# Patient Record
Sex: FEMALE | Race: WHITE | NOT HISPANIC OR LATINO | URBAN - METROPOLITAN AREA
[De-identification: names, ages, dates, MRNs, and addresses within clinical notes are randomized per-mention and may not be internally consistent; named-entity substitution may affect disease eponyms.]

---

## 2020-03-04 ENCOUNTER — TRANSFERRED RECORDS (OUTPATIENT)
Dept: HEALTH INFORMATION MANAGEMENT | Facility: CLINIC | Age: 60
End: 2020-03-04
Payer: COMMERCIAL

## 2021-12-23 ENCOUNTER — TRANSFERRED RECORDS (OUTPATIENT)
Dept: HEALTH INFORMATION MANAGEMENT | Facility: CLINIC | Age: 61
End: 2021-12-23
Payer: COMMERCIAL

## 2022-02-04 ENCOUNTER — MEDICAL CORRESPONDENCE (OUTPATIENT)
Dept: HEALTH INFORMATION MANAGEMENT | Facility: CLINIC | Age: 62
End: 2022-02-04
Payer: COMMERCIAL

## 2022-02-07 ENCOUNTER — TRANSCRIBE ORDERS (OUTPATIENT)
Dept: OTHER | Age: 62
End: 2022-02-07
Payer: COMMERCIAL

## 2022-02-07 DIAGNOSIS — C49.9 SOFT TISSUE SARCOMA (H): Primary | ICD-10-CM

## 2022-02-14 ENCOUNTER — TELEPHONE (OUTPATIENT)
Dept: ORTHOPEDICS | Facility: CLINIC | Age: 62
End: 2022-02-14
Payer: COMMERCIAL

## 2022-02-14 NOTE — TELEPHONE ENCOUNTER
I spoke with Kenyatta and verified that her previous work ups for the pelvis area have been at [facility] and [facility], she confirmed. I told her that I see her records in Care Everywhere and we will make sure we get the imaging pulled over as well for her appointment this Thursday with Dr. Moreno. She thanked me and had no other questions.  Anisha Davis ATC

## 2022-02-14 NOTE — TELEPHONE ENCOUNTER
[facility] Call Center    Phone Message    May a detailed message be left on voicemail: yes     Reason for Call: Other: Patient would like to know if medical records and imaging was received. Please contact patient. She has an appt this Thurs     Action Taken: Message routed to:  [facility] ([facility] ortho    Travel Screening: Not Applicable

## 2022-02-17 ENCOUNTER — OFFICE VISIT (OUTPATIENT)
Dept: ORTHOPEDICS | Facility: CLINIC | Age: 62
End: 2022-02-17
Payer: COMMERCIAL

## 2022-02-17 ENCOUNTER — DOCUMENTATION ONLY (OUTPATIENT)
Dept: ORTHOPEDICS | Facility: CLINIC | Age: 62
End: 2022-02-17

## 2022-02-17 VITALS — BODY MASS INDEX: 30.31 KG/M2 | HEIGHT: 68 IN | WEIGHT: 200 LBS

## 2022-02-17 DIAGNOSIS — C49.9 SOFT TISSUE SARCOMA (H): ICD-10-CM

## 2022-02-17 PROBLEM — Z90.5 HISTORY OF NEPHRECTOMY: Status: ACTIVE | Noted: 2019-11-16

## 2022-02-17 PROBLEM — Z15.89 MUTATION IN TP53 GENE: Status: ACTIVE | Noted: 2018-09-27

## 2022-02-17 PROBLEM — Z15.01 LI-FRAUMENI SYNDROME: Status: ACTIVE | Noted: 2018-09-27

## 2022-02-17 PROBLEM — Z15.01 MUTATION IN TP53 GENE: Status: ACTIVE | Noted: 2018-09-27

## 2022-02-17 PROBLEM — C48.0: Status: ACTIVE | Noted: 2022-02-17

## 2022-02-17 PROBLEM — N18.30 STAGE 3 CHRONIC KIDNEY DISEASE (H): Status: ACTIVE | Noted: 2019-11-16

## 2022-02-17 PROBLEM — Z15.09 MUTATION IN TP53 GENE: Status: ACTIVE | Noted: 2018-09-27

## 2022-02-17 PROBLEM — E66.9 OBESITY (BMI 30-39.9): Status: ACTIVE | Noted: 2021-10-16

## 2022-02-17 PROCEDURE — 99204 OFFICE O/P NEW MOD 45 MIN: CPT | Performed by: ORTHOPAEDIC SURGERY

## 2022-02-17 RX ORDER — SENNOSIDES A AND B 8.6 MG/1
8.6 TABLET, FILM COATED ORAL
COMMUNITY
End: 2022-03-08

## 2022-02-17 RX ORDER — BUPROPION HYDROCHLORIDE 150 MG/1
TABLET ORAL
COMMUNITY
Start: 2021-09-08 | End: 2022-03-08

## 2022-02-17 RX ORDER — FUROSEMIDE 20 MG
TABLET ORAL
COMMUNITY
Start: 2022-01-03

## 2022-02-17 RX ORDER — BUPROPION HYDROCHLORIDE 150 MG/1
150 TABLET ORAL DAILY
COMMUNITY
Start: 2021-12-27

## 2022-02-17 RX ORDER — CEFADROXIL 500 MG/1
CAPSULE ORAL
COMMUNITY
Start: 2022-01-05 | End: 2022-03-08

## 2022-02-17 RX ORDER — BUPROPION HYDROCHLORIDE 150 MG/1
150 TABLET, EXTENDED RELEASE ORAL
COMMUNITY
End: 2022-03-08

## 2022-02-17 NOTE — PROGRESS NOTES
[facility] Physicians, Orthopaedic Oncology Surgery Consultation  by Miguel Moreno M.D.    Kenyatta Dhillon MRN# [id number]    YOB: 1960     Requesting physician: No ref. provider found  Robbie Garcia MD [facility]            Assessment and Plan:   Assessment:  1. 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  Tissue confirmation with biopsy will be necessary however as other soft tissue neoplasms are possible such as myxoma is for schwannomas are possible.  2. Lesion of right superior pubic ramus adjacent to acetabulum was also noted with marrow signal abnormality.  Likely is related to the soft tissue mass in the buttock region.  PET CT imaging assessment to determine if the pubic ramus lesion is hypermetabolic will be useful.  3. History of leiomyosarcoma x 3 .    4. History of melanoma x2    Plan:  1. We will arrange for return on Monday next week for core needle biopsy with ultrasound guidance.  If visualization is not possible using our clinic ultrasound machine, then we will send her to interventional radiology for CT guidance or deeper radiology probes as needed.  2. Should a malignancy be confirmed, then PET/CT imaging for staging would be recommended.  3.         History of Present Illness:   61 year old female  chief complaint    In Dec 2021, fell down, had pain in many areas.  As part of routine annual full body MRI scanning, Brain MRI, Breast MRI, done in 2/2022, mass found in R buttock.  Asymptomatic at present.      Also has history of prior melanoma x2 and 3 prior surgical excisions of leiomyosarcoma and her groin, retroperitoneum, and uterus.      Current symptoms:  Problem: Possible new tumor in pelvis, right gluteal mass  Onset and duration: Was noticed in December on a full body scan, second full body scan in January to confirm.  Awakens from sleep due to sx's:  No  Precipitating  "Injury:  Yes  Patient fell down a hill in december  Other joints or sites painful:  Yes  Fever: No  Appetite change or weight loss: No  History of prior or existing cancer: Yes    Background history:  DX:  1. 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    TREATMENTS:  1.  / NEPHRECTOMY, History of Donor Nephrectomy  2. HYSTERECTOMY, TOTAL ABDOMINAL /  uterine cancer age 20    3.  / removal of pelvic mass L groin (Dr Isabel Hanson) [facility]  4. 2018 / General surg proc. / Right retroperitoneal tumor removal with Dr. Kelin Hanson; at [facility]; pathology came back consistent as leiomyosarcoma  5. 2021 Esophagogastroduodenoscopy, Surgeon: Hannah Silva MD; Location: [facility] ENDOSCOPY              Physical Exam:     EXAMINATION pertinent findings:   PSYCH: Pleasant, healthy-appearing, alert, oriented x3, cooperative. Normal mood and affect.  VITAL SIGNS: Height 1.727 m (5' 8\"), weight 90.7 kg (200 lb)..  Reviewed nursing intake notes.   Body mass index is 30.41 kg/m .  RESP: non labored breathing   ABD: benign, soft, non-tender, no acute peritoneal findings.  Multiple scars from  and prior retroperitoneal dissection and left lower quadrant.  No organomegaly or masses appreciated.  SKIN: grossly normal .  Multiple sites of prior skin biopsies noted in prior melanoma.  LYMPHATIC: grossly normal, no adenopathy, no extremity edema  NEURO: grossly normal , no motor deficits  VASCULAR: satisfactory perfusion of all extremities   MUSCULOSKELETAL:   Gait is normal.  Full range of motion of right hip joint.  Palpable firmness in the right buttock area, difficult to know if this represents a neoplasm.             Data:   All laboratory data reviewed  All imaging studies reviewed by me      Anatomical Region Laterality Modality   Pelvis -- Magnetic Resonance   Hip -- --     Indication :   Evaluate soft tissue mass.     COMPARISON:   CT scan of the abdomen and " pelvis dated 27 April 2018. Pelvic MRI dated 11 December 2013.     Technique :   Pelvic MRI with T1 in-and out of phase, T2, diffusion weighted, and   progressively delayed post-contrast images. Intravenous gadolinium administered.     Findings :   5.5 x 5.1 cm avidly enhancing well-circumscribed mass in the right gluteus   rafael muscle posterior to the right greater trochanter is new.   2.3 cm peripherally enhancing lesion in the right superior pubic ramus.   No other enhancing masses identified.   No adenopathy.   Edema in the left hamstring tendon. No other bony or soft tissue abnormalities   identified.     Impression :   1. 5.5 cm mass in the right gluteus rafael muscle is suspicious for a sarcoma.   2. Probable metastatic bony lesion in the left superior pubic ramus.   3. Strain versus partial tear of the left hamstring tendon.         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    Also see scanned health assessment forms.       Past Surgical History:   No past surgical history on file.         Social History:     Social History     Socioeconomic History     Marital status:      Spouse name: Not on file     Number of children: Not on file     Years of education: Not on file     Highest education level: Not on file   Occupational History     Not on file   Tobacco Use     Smoking status: Not on file      Smokeless tobacco: Not on file   Substance and Sexual Activity     Alcohol use: Not on file     Drug use: Not on file     Sexual activity: Not on file   Other Topics Concern     Not on file   Social History Narrative     Not on file     Social Determinants of Health     Financial Resource Strain: Not on file   Food Insecurity: Not on file   Transportation Needs: Not on file   Physical Activity: Not on file   Stress: Not on file   Social Connections: Not on file   Intimate Partner Violence: Not on file   Housing Stability: Not on file            Family History:     No family history on file.         Medications:     Current Outpatient Medications   Medication Sig     buPROPion (WELLBUTRIN XL) 150 MG 24 hr tablet TAKE ONE TABLET BY MOUTH ONCE DAILY **NEEDS APPOINTMENT FOR FURTHER REFILLS**     buPROPion (WELLBUTRIN XL) 150 MG 24 hr tablet Take 150 mg by mouth daily     cholecalciferol 50 MCG (2000 UT) CAPS Take 2,000 Units by mouth     furosemide (LASIX) 20 MG tablet TAKE ONE TABLET BY MOUTH ONCE DAILY AS NEEDED     omeprazole (PRILOSEC) 20 MG DR capsule TAKE ONE CAPSULE BY MOUTH TWICE A DAY **NEEDS TO BE SEEN FOR FURTHER REFILLS**     buPROPion (WELLBUTRIN SR) 150 MG 12 hr tablet Take 150 mg by mouth (Patient not taking: Reported on 2/17/2022)     cefadroxil (DURICEF) 500 MG capsule TAKE ONE CAPSULE BY MOUTH TWICE A DAY UNTIL ALL TAKEN (Patient not taking: Reported on 2/17/2022)     melatonin 3 MG CAPS Take 1 capsule by mouth     senna (SENOKOT) 8.6 MG tablet Take 8.6 mg by mouth (Patient not taking: Reported on 2/17/2022)     No current facility-administered medications for this visit.              Review of Systems:   A comprehensive 10 point review of systems (constitutional, ENT, cardiac, peripheral vascular, lymphatic, respiratory, GI, , Musculoskeletal, skin, Neurological) was performed and found to be negative except as described in this note.     See intake form completed by patient

## 2022-02-17 NOTE — LETTER
2/17/2022         RE: Kenyatta Dhillon  [address] [place] [facility] [place] [facility] [place] [address]        Dear Colleague,    Thank you for referring your patient, Kenyatta Dhillon, to the [facility] [place]. Please see a copy of my visit note below.        [facility] Physicians, Orthopaedic Oncology Surgery Consultation  by Miguel Moreno M.D.    Kenyatta Dhillon MRN# [id number]    YOB: 1960     Requesting physician: No ref. provider found  Robbie Garcia MD [facility]            Assessment and Plan:   Assessment:  1. 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  Tissue confirmation with biopsy will be necessary however as other soft tissue neoplasms are possible such as myxoma is for schwannomas are possible.  2. Lesion of right superior pubic ramus adjacent to acetabulum was also noted with marrow signal abnormality.  Likely is related to the soft tissue mass in the buttock region.  PET CT imaging assessment to determine if the pubic ramus lesion is hypermetabolic will be useful.  3. History of leiomyosarcoma x 3 .    4. History of melanoma x2    Plan:  1. We will arrange for return on Monday next week for core needle biopsy with ultrasound guidance.  If visualization is not possible using our clinic ultrasound machine, then we will send her to interventional radiology for CT guidance or deeper radiology probes as needed.  2. Should a malignancy be confirmed, then PET/CT imaging for staging would be recommended.  3.         History of Present Illness:   61 year old female  chief complaint    In Dec 2021, fell down, had pain in many areas.  As part of routine annual full body MRI scanning, Brain MRI, Breast MRI, done in 2/2022, mass found in R buttock.  Asymptomatic at present.      Also has history of prior melanoma x2 and 3 prior surgical excisions of leiomyosarcoma and her groin,  "retroperitoneum, and uterus.      Current symptoms:  Problem: Possible new tumor in pelvis, right gluteal mass  Onset and duration: Was noticed in December on a full body scan, second full body scan in January to confirm.  Awakens from sleep due to sx's:  No  Precipitating Injury:  Yes  Patient fell down a hill in december  Other joints or sites painful:  Yes  Fever: No  Appetite change or weight loss: No  History of prior or existing cancer: Yes    Background history:  DX:  1. 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    TREATMENTS:  1. 2007 / NEPHRECTOMY, History of Donor Nephrectomy  2. HYSTERECTOMY, TOTAL ABDOMINAL /  uterine cancer age 20    3.  / removal of pelvic mass L groin (Dr Isabel Hanson) [facility]  4. 2018 / General surg proc. / Right retroperitoneal tumor removal with Dr. Kelin Hanson; at [facility]; pathology came back consistent as leiomyosarcoma  5. 2021 Esophagogastroduodenoscopy, Surgeon: Hannah Silva MD; Location: [facility]              Physical Exam:     EXAMINATION pertinent findings:   PSYCH: Pleasant, healthy-appearing, alert, oriented x3, cooperative. Normal mood and affect.  VITAL SIGNS: Height 1.727 m (5' 8\"), weight 90.7 kg (200 lb)..  Reviewed nursing intake notes.   Body mass index is 30.41 kg/m .  RESP: non labored breathing   ABD: benign, soft, non-tender, no acute peritoneal findings.  Multiple scars from  and prior retroperitoneal dissection and left lower quadrant.  No organomegaly or masses appreciated.  SKIN: grossly normal .  Multiple sites of prior skin biopsies noted in prior melanoma.  LYMPHATIC: grossly normal, no adenopathy, no extremity edema  NEURO: grossly normal , no motor deficits  VASCULAR: satisfactory perfusion of all extremities   MUSCULOSKELETAL:   Gait is normal.  Full range of motion of right hip joint.  Palpable firmness in the right buttock area, difficult to know if this represents a " neoplasm.             Data:   All laboratory data reviewed  All imaging studies reviewed by me      Anatomical Region Laterality Modality   Pelvis -- Magnetic Resonance   Hip -- --     Indication :   Evaluate soft tissue mass.     COMPARISON:   CT scan of the abdomen and pelvis dated 27 April 2018. Pelvic MRI dated 11 December 2013.     Technique :   Pelvic MRI with T1 in-and out of phase, T2, diffusion weighted, and   progressively delayed post-contrast images. Intravenous gadolinium administered.     Findings :   5.5 x 5.1 cm avidly enhancing well-circumscribed mass in the right gluteus   rafael muscle posterior to the right greater trochanter is new.   2.3 cm peripherally enhancing lesion in the right superior pubic ramus.   No other enhancing masses identified.   No adenopathy.   Edema in the left hamstring tendon. No other bony or soft tissue abnormalities   identified.     Impression :   1. 5.5 cm mass in the right gluteus rafael muscle is suspicious for a sarcoma.   2. Probable metastatic bony lesion in the left superior pubic ramus.   3. Strain versus partial tear of the left hamstring tendon.         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    Also see scanned health assessment forms.       Past Surgical History:   No past surgical history on file.         Social History:     Social History      Socioeconomic History     Marital status:      Spouse name: Not on file     Number of children: Not on file     Years of education: Not on file     Highest education level: Not on file   Occupational History     Not on file   Tobacco Use     Smoking status: Not on file     Smokeless tobacco: Not on file   Substance and Sexual Activity     Alcohol use: Not on file     Drug use: Not on file     Sexual activity: Not on file   Other Topics Concern     Not on file   Social History Narrative     Not on file     Social Determinants of Health     Financial Resource Strain: Not on file   Food Insecurity: Not on file   Transportation Needs: Not on file   Physical Activity: Not on file   Stress: Not on file   Social Connections: Not on file   Intimate Partner Violence: Not on file   Housing Stability: Not on file            Family History:     No family history on file.         Medications:     Current Outpatient Medications   Medication Sig     buPROPion (WELLBUTRIN XL) 150 MG 24 hr tablet TAKE ONE TABLET BY MOUTH ONCE DAILY **NEEDS APPOINTMENT FOR FURTHER REFILLS**     buPROPion (WELLBUTRIN XL) 150 MG 24 hr tablet Take 150 mg by mouth daily     cholecalciferol 50 MCG (2000 UT) CAPS Take 2,000 Units by mouth     furosemide (LASIX) 20 MG tablet TAKE ONE TABLET BY MOUTH ONCE DAILY AS NEEDED     omeprazole (PRILOSEC) 20 MG DR capsule TAKE ONE CAPSULE BY MOUTH TWICE A DAY **NEEDS TO BE SEEN FOR FURTHER REFILLS**     buPROPion (WELLBUTRIN SR) 150 MG 12 hr tablet Take 150 mg by mouth (Patient not taking: Reported on 2/17/2022)     cefadroxil (DURICEF) 500 MG capsule TAKE ONE CAPSULE BY MOUTH TWICE A DAY UNTIL ALL TAKEN (Patient not taking: Reported on 2/17/2022)     melatonin 3 MG CAPS Take 1 capsule by mouth     senna (SENOKOT) 8.6 MG tablet Take 8.6 mg by mouth (Patient not taking: Reported on 2/17/2022)     No current facility-administered medications for this visit.              Review of Systems:   A comprehensive  10 point review of systems (constitutional, ENT, cardiac, peripheral vascular, lymphatic, respiratory, GI, , Musculoskeletal, skin, Neurological) was performed and found to be negative except as described in this note.     See intake form completed by patient

## 2022-02-17 NOTE — NURSING NOTE
"Chief Complaint   Patient presents with     Consult       61 year old  1960    Ht 1.727 m (5' 8\")   Wt 90.7 kg (200 lb)   BMI 30.41 kg/m        Date/Surgery/Surgeon/Hospital:  1. No past surgical history on file.      Pain Assessment  Patient Currently in Pain: No       Data Unavailable    Allergies   Allergen Reactions     Latex Rash       Current Outpatient Medications   Medication     buPROPion (WELLBUTRIN XL) 150 MG 24 hr tablet     buPROPion (WELLBUTRIN XL) 150 MG 24 hr tablet     cholecalciferol 50 MCG (2000 UT) CAPS     furosemide (LASIX) 20 MG tablet     omeprazole (PRILOSEC) 20 MG DR capsule     buPROPion (WELLBUTRIN SR) 150 MG 12 hr tablet     cefadroxil (DURICEF) 500 MG capsule     melatonin 3 MG CAPS     senna (SENOKOT) 8.6 MG tablet     No current facility-administered medications for this visit.             Questionnaires:    Promis 10 Assessment    PROMIS 10 2/17/2022   In general, would you say your health is: 3   In general, would you say your quality of life is: 3   In general, how would you rate your physical health? 2   In general, how would you rate your mental health, including your mood and your ability to think? 3   In general, how would you rate your satisfaction with your social activities and relationships? 3   In general, please rate how well you carry out your usual social activities and roles. (This includes activities at home, at work and in your community, and responsibilities as a parent, child, spouse, employee, friend, etc.) 3   To what extent are you able to carry out your everyday physical activities such as walking, climbing stairs, carrying groceries, or moving a chair? 5   In the past 7 days, how often have you been bothered by emotional problems such as feeling anxious, depressed, or irritable? 2   In the past 7 days, how would you rate your fatigue on average? 2   In the past 7 days, how would you rate your pain on average, where 0 means no pain, and 10 means worst " imaginable pain? 0   Global Mental Health Score 13   Global Physical Health Score 16   PROMIS TOTAL - SUBSCORES 29   Some recent data might be hidden

## 2022-02-17 NOTE — PROGRESS NOTES
1. 2007 / NEPHRECTOMY, History of Donor Nephrectomy  2. HYSTERECTOMY, TOTAL ABDOMINAL /  uterine cancer age 20    3. 2014 / removal of pelvic mass   4. 5/31/2018 / Genital surg proc. / Right retroperitoneal tumor removal with Dr. Hanson; at [facility]; pathology came back consistent as leiomyosarcoma  5. 9/17/2021 Esophagogastroduodenoscopy, Surgeon: Hannah Silva MD; Location: [facility]

## 2022-02-18 ENCOUNTER — TELEPHONE (OUTPATIENT)
Dept: ORTHOPEDICS | Facility: CLINIC | Age: 62
End: 2022-02-18
Payer: COMMERCIAL

## 2022-02-18 NOTE — TELEPHONE ENCOUNTER
Returned call to Jo-Ann after message left.  Jo-Ann had questions regarding why the bone biopsy cannot also be done in clinic on Monday.  This RN discussed how the biopsies are completed, the sequencing of events and the need to do any type of bone biopsy in a sterile surgical environment under anesthesia.    After thorough explanation, Jo-Ann verbalized understanding and will proceed with buttock biopsy on Monday .    BHARATI RichardsonN, RN  RN Care Coordinator, Dr. Josh SABILLON [facility]

## 2022-02-21 ENCOUNTER — OFFICE VISIT (OUTPATIENT)
Dept: ORTHOPEDICS | Facility: CLINIC | Age: 62
End: 2022-02-21
Payer: COMMERCIAL

## 2022-02-21 VITALS — BODY MASS INDEX: 32.58 KG/M2 | HEIGHT: 68 IN | WEIGHT: 215 LBS

## 2022-02-21 DIAGNOSIS — M79.89 SOFT TISSUE MASS: ICD-10-CM

## 2022-02-21 DIAGNOSIS — C49.9 SOFT TISSUE SARCOMA (H): Primary | ICD-10-CM

## 2022-02-21 PROCEDURE — 88342 IMHCHEM/IMCYTCHM 1ST ANTB: CPT | Mod: 26 | Performed by: PATHOLOGY

## 2022-02-21 PROCEDURE — 88307 TISSUE EXAM BY PATHOLOGIST: CPT | Mod: TC | Performed by: PHYSICIAN ASSISTANT

## 2022-02-21 PROCEDURE — 88342 IMHCHEM/IMCYTCHM 1ST ANTB: CPT | Mod: TC | Performed by: PHYSICIAN ASSISTANT

## 2022-02-21 PROCEDURE — 20206 BIOPSY MUSCLE PERQ NEEDLE: CPT | Mod: RT | Performed by: ORTHOPAEDIC SURGERY

## 2022-02-21 PROCEDURE — 88307 TISSUE EXAM BY PATHOLOGIST: CPT | Mod: 26 | Performed by: PATHOLOGY

## 2022-02-21 PROCEDURE — 88341 IMHCHEM/IMCYTCHM EA ADD ANTB: CPT | Mod: 26 | Performed by: PATHOLOGY

## 2022-02-21 ASSESSMENT — PATIENT HEALTH QUESTIONNAIRE - PHQ9: SUM OF ALL RESPONSES TO PHQ QUESTIONS 1-9: 0

## 2022-02-21 NOTE — NURSING NOTE
"Chief Complaint   Patient presents with     Procedure     core neddle biopsy        61 year old  1960    Ht 1.73 m (5' 8.11\")   Wt 97.5 kg (215 lb)   BMI 32.59 kg/m      Pain Assessment  Patient Currently in Pain: [id number] - [place], [place] - [address]    Allergies   Allergen Reactions     Latex Rash           Current Outpatient Medications   Medication     buPROPion (WELLBUTRIN XL) 150 MG 24 hr tablet     cholecalciferol 50 MCG (2000 UT) CAPS     furosemide (LASIX) 20 MG tablet     melatonin 3 MG CAPS     omeprazole (PRILOSEC) 20 MG DR capsule     buPROPion (WELLBUTRIN SR) 150 MG 12 hr tablet     buPROPion (WELLBUTRIN XL) 150 MG 24 hr tablet     cefadroxil (DURICEF) 500 MG capsule     senna (SENOKOT) 8.6 MG tablet     No current facility-administered medications for this visit.       Questionnaires:      Promis 10 Assessment    PROMIS 10 2/17/2022   In general, would you say your health is: 3   In general, would you say your quality of life is: 3   In general, how would you rate your physical health? 2   In general, how would you rate your mental health, including your mood and your ability to think? 3   In general, how would you rate your satisfaction with your social activities and relationships? 3   In general, please rate how well you carry out your usual social activities and roles. (This includes activities at home, at work and in your community, and responsibilities as a parent, child, spouse, employee, friend, etc.) 3   To what extent are you able to carry out your everyday physical activities such as walking, climbing stairs, carrying groceries, or moving a chair? 5   In the past 7 days, how often have you been bothered by emotional problems such as feeling anxious, depressed, or irritable? 2   In the past 7 days, how would you rate your fatigue on average? 2   In the past 7 days, how would you rate your pain on average, where 0 means no pain, and 10 means worst imaginable " pain? 0   Global Mental Health Score 13   Global Physical Health Score 16   PROMIS TOTAL - SUBSCORES 29   Some recent data might be hidden

## 2022-02-21 NOTE — PROGRESS NOTES
[facility] ORTHOPEDIC CLINIC [address] [address] [phone]  Dept: [phone]  ______________________________________________________________________________    Patient: Kenyatta Dhillon   : 1960   MRN: [id number]   2022    INVASIVE PROCEDURE SAFETY CHECKLIST    Date: 2022   Procedure: Core needle biopsy of right buttock  Patient Name: Kenyatta Dhillon  MRN: [id number]  YOB: 1960    Action: Complete sections as appropriate. Any discrepancy results in a HARD COPY until resolved.     PRE PROCEDURE:  Patient ID verified with 2 identifiers (name and  or MRN): Yes  Procedure and site verified with patient/designee (when able): Yes  Accurate consent documentation in medical record: Yes  H&P (or appropriate assessment) documented in medical record: Yes  H&P must be up to 20 days prior to procedure and updates within 24 hours of procedure as applicable: Yes  Relevant diagnostic and radiology test results appropriately labeled and displayed as applicable: Yes  Procedure site(s) marked with provider initials: Yes    TIMEOUT:  Time-Out performed immediately prior to starting procedure, including verbal and active participation of all team members addressing the following:Yes  * Correct patient identify  * Confirmed that the correct side and site are marked  * An accurate procedure consent form  * Agreement on the procedure to be done  * Correct patient position  * Relevant images and results are properly labeled and appropriately displayed  * The need to administer antibiotics or fluids for irrigation purposes during the procedure as applicable   * Safety precautions based on patient history or medication use    DURING PROCEDURE: Verification of correct person, site, and procedures any time the responsibility for care of the patient is transferred to another member of the care team.       The following  medications were given:         Prior to injection, verified patient identity using patient's name and date of birth.  Due to injection administration, patient instructed to remain in clinic for 15 minutes  afterwards, and to report any adverse reaction to me immediately.    Biopsy was Performed   Medication Name: Lidocaine 1%  NDC 95934-746-90  Drug Amount Wasted:    Vial/Syringe: Multi dose vial  Expiration Date:  10/25      Scribed by Carolynn Albert ATC for Dr. Moreno on February 21, 2022 at 12:00pm based on the provider's statements to me.     Carolynn Albert ATC

## 2022-02-21 NOTE — LETTER
2/21/2022     RE: Kenyatta Dhillon  [address] [facility] [address]    Dear Colleague,    Thank you for referring your patient, Kenyatta Dhillon, to the [facility] ORTHOPEDIC CLINIC [place]. Please see a copy of my visit note below.        [facility] Physicians, Orthopaedic Oncology Surgery Consultation  by Miguel Moreno M.D.    Kenyatta Dhillon MRN# [id number]    YOB: 1960     Requesting physician: No ref. provider found  Robbie Garcia MD [facility]     Background history:  DX:  1. 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    TREATMENTS:  1. 2007 / NEPHRECTOMY, History of Donor Nephrectomy  2. HYSTERECTOMY, TOTAL ABDOMINAL /  uterine cancer age 20    3. 2014 / removal of pelvic mass L groin (Dr Isabel Hanson) [facility]  4. 5/31/2018 / General surg proc. / Right retroperitoneal tumor removal with Dr. Kelin Hanson; at [facility]; pathology came back consistent as leiomyosarcoma  5. 9/17/2021 Esophagogastroduodenoscopy, Surgeon: Hannah Silva MD; Location: [facility] ENDOSCOPY   6. 2/21/2022, In office core biopsy of right buttocks soft tissue mass, Dr. Moreno        1)The patient returns to clinic today for core needle biopsy of her right buttock soft tissue mass.  Using ultrasound was seen the mass was identified and marked.  Area was sterilely prepped and draped.  Local anesthetic was administered to the biopsy site.  Using ultrasound machine we are able to obtain 6 biopsies of the right buttock mass.  Steri-Strips and bandage were applied to the procedure site.  Is okay for the patient to shower but should avoid taking baths for 2 days.  There are no immediate complications following the procedure.  The biopsies were sent to pathology for evaluation.  Once we have obtained the results of the biopsy we will follow-up with the patient with further recommendations.    2)  If patholgy comes back as malignant we obtain a PET CT  for further evaluation.      All questions were answered and the patient was in agreement the plan.  I also performed her biopsy procedure      Miguel Moreno MD  [facility] Family Professor  Oncology and Adult Reconstructive Surgery  Dept Orthopaedic Surgery, [facility] Physicians  [phone] office, [phone] pager  [email or web address]    Total combined visit time and work time before and after clinic visit = 40 min      [place] [id number] [facility]  4TH FLOOR  [facility] [address]  [phone]  Dept: [phone]  ______________________________________________________________________________    Patient: Kenyatta Dhillon   : 1960   MRN: [id number]   2022    INVASIVE PROCEDURE SAFETY CHECKLIST    Date: 2022   Procedure: Core needle biopsy of right buttock  Patient Name: Kenyatta Dhillon  MRN: [id number]  YOB: 1960    Action: Complete sections as appropriate. Any discrepancy results in a HARD COPY until resolved.     PRE PROCEDURE:  Patient ID verified with 2 identifiers (name and  or MRN): Yes  Procedure and site verified with patient/designee (when able): Yes  Accurate consent documentation in medical record: Yes  H&P (or appropriate assessment) documented in medical record: Yes  H&P must be up to 20 days prior to procedure and updates within 24 hours of procedure as applicable: Yes  Relevant diagnostic and radiology test results appropriately labeled and displayed as applicable: Yes  Procedure site(s) marked with provider initials: Yes    TIMEOUT:  Time-Out performed immediately prior to starting procedure, including verbal and active participation of all team members addressing the following:Yes  * Correct patient identify  * Confirmed that the correct side and site are marked  * An accurate procedure consent form  * Agreement on the procedure to be done  * Correct patient position  * Relevant images and results are  properly labeled and appropriately displayed  * The need to administer antibiotics or fluids for irrigation purposes during the procedure as applicable   * Safety precautions based on patient history or medication use    DURING PROCEDURE: Verification of correct person, site, and procedures any time the responsibility for care of the patient is transferred to another member of the care team.       The following medications were given:     Prior to injection, verified patient identity using patient's name and date of birth.  Due to injection administration, patient instructed to remain in clinic for 15 minutes  afterwards, and to report any adverse reaction to me immediately.    Biopsy was Performed   Medication Name: Lidocaine 1%  NDC 04250-830-29  Drug Amount Wasted:    Vial/Syringe: Multi dose vial  Expiration Date:  10/25      Scribed by Carolynn Albert ATC for Dr. Moreno on February 21, 2022 at 12:00pm based on the provider's statements to me.     Carolynn Albert ATC         Under 1% lidocane topical anesthesia, a Core Needle Biopsy of the above mentioned mass was sampled.  There were no complications. A sterile dressing was applied.

## 2022-02-21 NOTE — PROGRESS NOTES
Under 1% lidocane topical anesthesia, a Core Needle Biopsy of the above mentioned mass was sampled.  There were no complications. A sterile dressing was applied.

## 2022-02-21 NOTE — PROGRESS NOTES
[facility] Physicians, Orthopaedic Oncology Surgery Consultation  by Miguel Moreno M.D.    Kenyatta Dhillon MRN# [id number]    YOB: 1960     Requesting physician: No ref. provider found  Robbie Garcia MD [facility]     Background history:  DX:  1. 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    TREATMENTS:  1. 2007 / NEPHRECTOMY, History of Donor Nephrectomy  2. HYSTERECTOMY, TOTAL ABDOMINAL /  uterine cancer age 20    3. 2014 / removal of pelvic mass L groin (Dr Isabel Hanson) [facility]  4. 5/31/2018 / General surg proc. / Right retroperitoneal tumor removal with Dr. Kelin Hanson; at [facility]; pathology came back consistent as leiomyosarcoma  5. 9/17/2021 Esophagogastroduodenoscopy, Surgeon: Hannah Silva MD; Location: [facility] ENDOSCOPY   6. 2/21/2022, In office core biopsy of right buttocks soft tissue mass, Dr. Moreno        1)The patient returns to clinic today for core needle biopsy of her right buttock soft tissue mass.  Using ultrasound was seen the mass was identified and marked.  Area was sterilely prepped and draped.  Local anesthetic was administered to the biopsy site.  Using ultrasound machine we are able to obtain 6 biopsies of the right buttock mass.  Steri-Strips and bandage were applied to the procedure site.  Is okay for the patient to shower but should avoid taking baths for 2 days.  There are no immediate complications following the procedure.  The biopsies were sent to pathology for evaluation.  Once we have obtained the results of the biopsy we will follow-up with the patient with further recommendations.    2)  If patholgy comes back as malignant we obtain a PET CT for further evaluation.      All questions were answered and the patient was in agreement the plan.  I also performed her biopsy procedure      Miguel Moreno MD  [facility] Family Professor  Oncology and Adult Reconstructive Surgery  Dept Orthopaedic Surgery, [facility]  [place] Physicians  [phone] office, [phone] pager  [email or web address]    Total combined visit time and work time before and after clinic visit = 40 min

## 2022-02-21 NOTE — PATIENT INSTRUCTIONS
Keep the bandage on the biopsy site for 48 hours.   Reduce activity of affected extremity for 48 hours.  If drainage occurs, place gauze dressing with tape on biopsy site.  After 48 hours, you may use steri-strips  whenever desired.

## 2022-02-23 LAB
PATH REPORT.COMMENTS IMP SPEC: ABNORMAL
PATH REPORT.COMMENTS IMP SPEC: YES
PATH REPORT.FINAL DX SPEC: ABNORMAL
PATH REPORT.GROSS SPEC: ABNORMAL
PATH REPORT.MICROSCOPIC SPEC OTHER STN: ABNORMAL
PATH REPORT.RELEVANT HX SPEC: ABNORMAL
PHOTO IMAGE: ABNORMAL

## 2022-02-24 ENCOUNTER — TELEPHONE (OUTPATIENT)
Dept: ORTHOPEDICS | Facility: CLINIC | Age: 62
End: 2022-02-24
Payer: COMMERCIAL

## 2022-02-24 DIAGNOSIS — C49.9 SARCOMA OF SOFT TISSUE (H): Primary | ICD-10-CM

## 2022-02-24 NOTE — TELEPHONE ENCOUNTER
Returned call to Jo-Ann after voicemail left.  Confirmed Jo-Ann had read Dr. Moreno's message.  Shared that the PET was approved by her insurance.  Jo-Ann will schedule PET and return call to this RN and I will assist with scheduling follow up with Dr. Moreno.    Linda Dennis, BSN, RN  RN Care Coordinator, Dr. Josh SABILLON [facility] Orthopedic [facility]

## 2022-02-24 NOTE — TELEPHONE ENCOUNTER
Multiple calls between this RN and Jo-Ann to discuss plan of care.  Jo-Ann wanted to confirm that Dr. Moreno had shared her biopsy results with her Oncologist Dr. Perez, Dr. Moreno will send him a message.  Jo-Ann scheduled her PET scan for 3/2. Offered a visit with Dr. Moreno on 3/3, Jo-Ann would prefer to wait until 3/7 when her  can be present for a virtual visit.  Appointment confirmed and scheduled.    All questions answered.    NAIF Richardson, RN  RN Care Coordinator, Dr. Moreno  [facility] Orthopedic [facility]

## 2022-02-24 NOTE — RESULT ENCOUNTER NOTE
Dear Ms. Dhillon:    Your biopsy confirms a leiomyosarcoma tumor.  I would advise that we get a PET-CT done to see if there are any other tumors.  If not, then proceeding with surgical removal is advised with consideration of radiation treatment depending upon the surgical margins that can be attained at surgery.    I've asked Linda to setup a virtual visit to discuss after the PET-CT is completed.    Regards,      Miguel Moreno MD  2/24/2022  8:17 AM

## 2022-02-24 NOTE — TELEPHONE ENCOUNTER
Message left for Jo-Ann to reference the my chart message Dr. Moreno sent her this am.  Have placed PET scan order. I will follow up with her as soon as I have approval from prior auth to assist her with scheduling.    BHARATI RichardsonN, RN  RN Care Coordinator, Dr. Moreno  [facility] Orthopedic [facility]

## 2022-02-24 NOTE — TELEPHONE ENCOUNTER
Additional message left that PET scan has been approved by prior auth.  Provided Jo-Ann with central scheduling number, asked to reach out to this RN once the PET is scheduled so I can assist her with scheduling follow up with Dr. Moreno.    Linda Dennis, BHARATIN, RN  RN Care Coordinator, Dr. Josh SABILLON [facility] Orthopedic [facility]

## 2022-02-25 ENCOUNTER — TELEPHONE (OUTPATIENT)
Dept: ORTHOPEDICS | Facility: CLINIC | Age: 62
End: 2022-02-25
Payer: COMMERCIAL

## 2022-02-25 DIAGNOSIS — Z53.9 ERRONEOUS ENCOUNTER--DISREGARD: Primary | ICD-10-CM

## 2022-03-01 ENCOUNTER — HOSPITAL ENCOUNTER (OUTPATIENT)
Dept: PET IMAGING | Facility: CLINIC | Age: 62
Setting detail: NUCLEAR MEDICINE
Discharge: HOME | End: 2022-03-01
Attending: ORTHOPAEDIC SURGERY | Admitting: ORTHOPAEDIC SURGERY
Payer: COMMERCIAL

## 2022-03-01 DIAGNOSIS — C49.9 SARCOMA OF SOFT TISSUE (H): ICD-10-CM

## 2022-03-01 LAB
CREAT BLD-MCNC: 1.2 MG/DL (ref 0.5–1)
GFR SERPL CREATININE-BSD FRML MDRD: 51 ML/MIN/1.73M2
GLUCOSE BLDC GLUCOMTR-MCNC: 101 MG/DL (ref 70–99)

## 2022-03-01 PROCEDURE — 343N000001 HC RX 343: Performed by: ORTHOPAEDIC SURGERY

## 2022-03-01 PROCEDURE — A9552 F18 FDG: HCPCS | Performed by: ORTHOPAEDIC SURGERY

## 2022-03-01 PROCEDURE — 71250 CT THORAX DX C-: CPT | Mod: 59,PS

## 2022-03-01 PROCEDURE — 78816 PET IMAGE W/CT FULL BODY: CPT | Mod: 26

## 2022-03-01 PROCEDURE — 82565 ASSAY OF CREATININE: CPT

## 2022-03-01 PROCEDURE — 82962 GLUCOSE BLOOD TEST: CPT

## 2022-03-01 RX ORDER — IOPAMIDOL 755 MG/ML
20-135 INJECTION, SOLUTION INTRAVASCULAR ONCE
Status: DISCONTINUED | OUTPATIENT
Start: 2022-03-01 | End: 2022-03-01

## 2022-03-01 RX ADMIN — FLUDEOXYGLUCOSE F-18 12.87 MCI.: 500 INJECTION, SOLUTION INTRAVENOUS at 07:12

## 2022-03-03 ENCOUNTER — VIRTUAL VISIT (OUTPATIENT)
Dept: ORTHOPEDICS | Facility: CLINIC | Age: 62
End: 2022-03-03
Payer: COMMERCIAL

## 2022-03-03 ENCOUNTER — TELEPHONE (OUTPATIENT)
Dept: ORTHOPEDICS | Facility: CLINIC | Age: 62
End: 2022-03-03

## 2022-03-03 DIAGNOSIS — C49.9 LEIOMYOSARCOMA (H): ICD-10-CM

## 2022-03-03 DIAGNOSIS — C49.9 SARCOMA OF SOFT TISSUE (H): Primary | ICD-10-CM

## 2022-03-03 DIAGNOSIS — Z15.01 LI-FRAUMENI SYNDROME: Primary | ICD-10-CM

## 2022-03-03 DIAGNOSIS — C49.9 SOFT TISSUE SARCOMA (H): Primary | ICD-10-CM

## 2022-03-03 PROCEDURE — 99215 OFFICE O/P EST HI 40 MIN: CPT | Mod: GT | Performed by: ORTHOPAEDIC SURGERY

## 2022-03-03 NOTE — LETTER
3/3/2022         RE: Kenyatta Dhillon  [address] [facility] [address]        Dear Colleague,    Thank you for referring your patient, Kenyatta Dhillon, to the [facility] ORTHOPEDIC CLINIC Branch. Please see a copy of my visit note below.        [facility] Physicians, Orthopaedic Oncology Surgery Consultation  by Miguel Moreno M.D.    Kenyatta Dhillon MRN# [id number]    YOB: 1960     Requesting physician: No ref. provider found  Robbie Garcia MD [facility]     Background history:  DX:  1. 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  2. 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    TREATMENTS:  1. 2007 / NEPHRECTOMY, History of Donor Nephrectomy  2. HYSTERECTOMY, TOTAL ABDOMINAL /  uterine cancer age 20    3. 2014 / removal of pelvic mass L groin (Dr Isabel Hanson) [facility]  4. 5/31/2018 / General surg proc. / Right retroperitoneal tumor removal with Dr. Kelin Hanson; at [facility]; pathology came back consistent as leiomyosarcoma  5. 9/17/2021 Esophagogastroduodenoscopy, Surgeon: Hannah Silva MD; Location: [facility] ENDOSCOPY   6. 2/21/2022, In office core biopsy of right buttocks soft tissue mass, Dr. Moreno      Virtual visit with Jo-Ann today to review the results of the PET/CT examination.  Several lung nodules, mainly the periphery of the left side were noted.  No previous CT examinations have been performed.  Additional site in the right superior pubic ramus and in the sacrum is my pression is likely represent sites of metastatic disease.  I have spoken with Dr. Linden Blanco today who has requested that we arrange for needle biopsy of the lung nodules and consultation with Dr. Kai Elizalde regarding systemic therapy for her presumed metastatic leiomyosarcoma.    Impression and plan:  1.  Rule out metastatic leiomyosarcoma with bony and pulmonary involvement  2.  Arrangements for consultation  with lung nodule biopsy team have been arranged already they will decide upon either needle biopsy or VATS procedure for diagnostic purposes  3.  Consultation with Dr. Kai Elizalde of our sarcoma medical oncologist  4.  We will defer consideration for surgical excision of the buttock tumor for the time being until staging is completed and decision regarding systemic therapy is made.    MD Lexi Lee Family Professor  Oncology and Adult Reconstructive Surgery  Dept Orthopaedic Surgery, [facility] Physicians  [phone] office, [phone] pager  [email or web address]      Virtual-Visit Details    Type of service:  Video/telephone Visit  Video/telephone total duration (including visit time, pre and post visit work time as documented above on the same day of service): 40    Visit start time: 1625  Visit end time:1639  Originating Location (pt. Location): Home  Distant Location (provider location):  [facility] ORTHOPEDIC [facility]   Platform used for Virtual Visit: [employer]

## 2022-03-03 NOTE — TELEPHONE ENCOUNTER
Returned call to Jo-Ann, discussed that her video visit will be at the end of the day. All questions answered.    BHARATI RichardsonN, RN  RN Care Coordinator, Dr. Josh SABILLON [facility] Orthopedic [facility]

## 2022-03-03 NOTE — PROGRESS NOTES
[facility] Physicians, Orthopaedic Oncology Surgery Consultation  by Miguel Moreno M.D.    Kenyatta Dhillon MRN# [id number]    YOB: 1960     Requesting physician: No ref. provider found  Robbie Garica MD [facility]     Background history:  DX:  1. 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  2. 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    TREATMENTS:  1. 2007 / NEPHRECTOMY, History of Donor Nephrectomy  2. HYSTERECTOMY, TOTAL ABDOMINAL /  uterine cancer age 20    3. 2014 / removal of pelvic mass L groin (Dr Isabel Hanson) [facility]  4. 5/31/2018 / General surg proc. / Right retroperitoneal tumor removal with Dr. Kelin Hanson; at [facility]; pathology came back consistent as leiomyosarcoma  5. 9/17/2021 Esophagogastroduodenoscopy, Surgeon: Hannah Silva MD; Location: [facility] ENDOSCOPY   6. 2/21/2022, In office core biopsy of right buttocks soft tissue mass, Dr. Moreno      Virtual visit with Jo-Ann duarte to review the results of the PET/CT examination.  Several lung nodules, mainly the periphery of the left side were noted.  No previous CT examinations have been performed.  Additional site in the right superior pubic ramus and in the sacrum is my pression is likely represent sites of metastatic disease.  I have spoken with Dr. Linden Blanco today who has requested that we arrange for needle biopsy of the lung nodules and consultation with Dr. Kai Elizalde regarding systemic therapy for her presumed metastatic leiomyosarcoma.    Impression and plan:  1.  Rule out metastatic leiomyosarcoma with bony and pulmonary involvement  2.  Arrangements for consultation with lung nodule biopsy team have been arranged already they will decide upon either needle biopsy or VATS procedure for diagnostic purposes  3.  Consultation with Dr. Kai Elizalde of our sarcoma medical oncologist  4.  We will defer consideration for surgical excision  of the buttock tumor for the time being until staging is completed and decision regarding systemic therapy is made.    Miguel Moreno MD [facility] Family Professor  Oncology and Adult Reconstructive Surgery  Dept Orthopaedic Surgery, [facility] Physicians  [phone] office, [phone] pager  [email or web address]      Virtual-Visit Details    Type of service:  Video/telephone Visit  Video/telephone total duration (including visit time, pre and post visit work time as documented above on the same day of service): 40    Visit start time: 1625  Visit end time:1639  Originating Location (pt. Location): Home  Distant Location (provider location):  [facility] ORTHOPEDIC [facility]   Platform used for Virtual Visit: [employer]

## 2022-03-03 NOTE — TELEPHONE ENCOUNTER
Multiple calls between this RN and Jo-Ann discussing that there has been a change in her PET scan, Dr. Moreno has spoken with her oncologist , and he would like to speak with her. Trying to coordinate video visit for today.    Discussed referral to Oncologist Dr. Elizalde and referral to Lung Nodule team here at the clinic for recommended lung biopsy.    Jo-Ann verbalized understanding, will talk with her  and attempt to identify a time today to speak with Dr. Moreno regarding her treatment plan.    BHARATI RichardsonN, RN  RN Care Coordinator, Dr. Josh SABILLON [facility]

## 2022-03-04 ENCOUNTER — PATIENT OUTREACH (OUTPATIENT)
Dept: ONCOLOGY | Facility: CLINIC | Age: 62
End: 2022-03-04
Payer: COMMERCIAL

## 2022-03-04 ENCOUNTER — DOCUMENTATION ONLY (OUTPATIENT)
Dept: INTERVENTIONAL RADIOLOGY/VASCULAR | Facility: CLINIC | Age: 62
End: 2022-03-04
Payer: COMMERCIAL

## 2022-03-04 ENCOUNTER — TELEPHONE (OUTPATIENT)
Dept: ORTHOPEDICS | Facility: CLINIC | Age: 62
End: 2022-03-04
Payer: COMMERCIAL

## 2022-03-04 DIAGNOSIS — C49.9 SARCOMA OF SOFT TISSUE (H): Primary | ICD-10-CM

## 2022-03-04 NOTE — PROGRESS NOTES
62 yo with newly diagnosed leiomyosarcoma now with new lung nodules. Discussed at nodule conference. Dr. Kyle agreed to CT biopsy, radiologist choice, though bigger more inferior lesion is his recommendation.

## 2022-03-04 NOTE — PROGRESS NOTES
Review of Oncology referral from Dr Moreno for pt with hx LMS, recurrence and possible lung mets.      Hx of leiomyosarcoma 5/2018 RP mass, recurrence right buttock 2/2022, established w/ Dr Perez [facility]. Now with pulmonary mets. Dr Moreno saw in consult; referring to Pulm team for lung bx and Dr Elizalde for treatment options.    Pt meets w/ Dr Duran (IP) 3/8 to discuss pulm bx. Dr Elizalde's first available spot is 3/23 Wed. Will call to offer 3/23 appt to pt and request to MD to move up if possible.

## 2022-03-04 NOTE — TELEPHONE ENCOUNTER
Jo-Ann called this RN directly to discuss follow up after her virtual visit yesterday.  Discussed again with Jo-Ann that she will be contacted by the IR team to schedule her lung biopsy and the coordination with that procedure.  Discussed that she was presented at the Lung Nodule Conference today and they appt she has this next week is with a physician on this team.  Discussed that she should also be contacted by Dr. Elizalde team.    All questions answered for now.    NAIF Richardson, RN  RN Care Coordinator, Dr. Josh SABILLON [facility]

## 2022-03-08 ENCOUNTER — OFFICE VISIT (OUTPATIENT)
Dept: PULMONOLOGY | Facility: CLINIC | Age: 62
End: 2022-03-08
Attending: ORTHOPAEDIC SURGERY
Payer: COMMERCIAL

## 2022-03-08 VITALS
OXYGEN SATURATION: 99 % | HEART RATE: 62 BPM | TEMPERATURE: 98.2 F | WEIGHT: 212 LBS | SYSTOLIC BLOOD PRESSURE: 160 MMHG | BODY MASS INDEX: 32.13 KG/M2 | DIASTOLIC BLOOD PRESSURE: 96 MMHG

## 2022-03-08 DIAGNOSIS — C49.9 SOFT TISSUE SARCOMA (H): ICD-10-CM

## 2022-03-08 PROCEDURE — G0463 HOSPITAL OUTPT CLINIC VISIT: HCPCS

## 2022-03-08 RX ORDER — MULTIPLE VITAMINS W/ MINERALS TAB 9MG-400MCG
TAB ORAL
COMMUNITY

## 2022-03-08 ASSESSMENT — PAIN SCALES - GENERAL: PAINLEVEL: NO PAIN (0)

## 2022-03-08 NOTE — NURSING NOTE
"Oncology Rooming Note    March 8, 2022 8:12 AM   Kenyatta Dhillon is a 61 year old female who presents for:    Chief Complaint   Patient presents with     Oncology Clinic Visit     soft tissue sarcoma     Initial Vitals: BP (!) 160/96   Pulse 62   Temp 98.2  F (36.8  C) (Oral)   Wt 96.2 kg (212 lb)   SpO2 99%   BMI 32.13 kg/m   Estimated body mass index is 32.13 kg/m  as calculated from the following:    Height as of 2/21/22: 1.73 m (5' 8.11\").    Weight as of this encounter: 96.2 kg (212 lb). Body surface area is 2.15 meters squared.  No Pain (0) Comment: Data Unavailable   No LMP recorded. Patient has had a hysterectomy.  Allergies reviewed: Yes  Medications reviewed: Yes    Medications: Medication refills not needed today.  Pharmacy name entered into EPIC: [id number] - [place], [place] - [address]    Clinical concerns: none       Monica Thompson CMA            "

## 2022-03-08 NOTE — LETTER
3/8/2022     RE: Kenyatta Dhillon  [address] [facility] [address]     Dear Colleague,    Thank you for referring your patient, Kenyatta Dhillon, to the [facility] CANCER CLINIC at [facility]. Please see a copy of my visit note below.    Error in scheduling    Again, thank you for allowing me to participate in the care of your patient.      Sincerely,    Moshe Duran MD

## 2022-03-11 DIAGNOSIS — Z11.59 ENCOUNTER FOR SCREENING FOR OTHER VIRAL DISEASES: Primary | ICD-10-CM

## 2022-03-14 ENCOUNTER — LAB (OUTPATIENT)
Dept: LAB | Facility: CLINIC | Age: 62
End: 2022-03-14
Attending: NURSE PRACTITIONER
Payer: COMMERCIAL

## 2022-03-14 ENCOUNTER — TELEPHONE (OUTPATIENT)
Dept: ORTHOPEDICS | Facility: CLINIC | Age: 62
End: 2022-03-14
Payer: COMMERCIAL

## 2022-03-14 DIAGNOSIS — Z11.59 ENCOUNTER FOR SCREENING FOR OTHER VIRAL DISEASES: ICD-10-CM

## 2022-03-14 LAB — SARS-COV-2 RNA RESP QL NAA+PROBE: NEGATIVE

## 2022-03-14 PROCEDURE — U0003 INFECTIOUS AGENT DETECTION BY NUCLEIC ACID (DNA OR RNA); SEVERE ACUTE RESPIRATORY SYNDROME CORONAVIRUS 2 (SARS-COV-2) (CORONAVIRUS DISEASE [COVID-19]), AMPLIFIED PROBE TECHNIQUE, MAKING USE OF HIGH THROUGHPUT TECHNOLOGIES AS DESCRIBED BY CMS-2020-01-R: HCPCS

## 2022-03-14 PROCEDURE — U0005 INFEC AGEN DETEC AMPLI PROBE: HCPCS

## 2022-03-14 NOTE — TELEPHONE ENCOUNTER
Follow up call to Jo-Ann after message left.  Jo-Ann wanted information on how to contact Dr. Kelly/team, she would prefer that her initial visit not be virtual.  Provided contact number to the oncology clinic, encouraged her to call and inquire about her appointment.    Confirmed that she has a chest biopsy scheduled for Friday.    All questions answered for now.    BHARATI RichardsonN, RN  RN Care Coordinator, Dr. Josh SABILLON [facility] Orthopedic [facility]

## 2022-03-18 ENCOUNTER — HOSPITAL ENCOUNTER (OUTPATIENT)
Facility: CLINIC | Age: 62
Discharge: HOME | End: 2022-03-18
Attending: ORTHOPAEDIC SURGERY | Admitting: RADIOLOGY
Payer: COMMERCIAL

## 2022-03-18 ENCOUNTER — APPOINTMENT (OUTPATIENT)
Dept: GENERAL RADIOLOGY | Facility: CLINIC | Age: 62
End: 2022-03-18
Attending: ORTHOPAEDIC SURGERY
Payer: COMMERCIAL

## 2022-03-18 ENCOUNTER — APPOINTMENT (OUTPATIENT)
Dept: MEDSURG UNIT | Facility: CLINIC | Age: 62
End: 2022-03-18
Attending: ORTHOPAEDIC SURGERY
Payer: COMMERCIAL

## 2022-03-18 ENCOUNTER — APPOINTMENT (OUTPATIENT)
Dept: INTERVENTIONAL RADIOLOGY/VASCULAR | Facility: CLINIC | Age: 62
End: 2022-03-18
Attending: ORTHOPAEDIC SURGERY
Payer: COMMERCIAL

## 2022-03-18 VITALS
WEIGHT: 210 LBS | HEART RATE: 76 BPM | BODY MASS INDEX: 31.83 KG/M2 | HEIGHT: 68 IN | OXYGEN SATURATION: 97 % | RESPIRATION RATE: 18 BRPM | SYSTOLIC BLOOD PRESSURE: 135 MMHG | TEMPERATURE: 98.2 F | DIASTOLIC BLOOD PRESSURE: 66 MMHG

## 2022-03-18 DIAGNOSIS — C49.9 SARCOMA OF SOFT TISSUE (H): ICD-10-CM

## 2022-03-18 LAB
ERYTHROCYTE [DISTWIDTH] IN BLOOD BY AUTOMATED COUNT: 13.4 % (ref 10–15)
HCT VFR BLD AUTO: 41.7 % (ref 35–47)
HGB BLD-MCNC: 13.4 G/DL (ref 11.7–15.7)
INR PPP: 1.01 (ref 0.85–1.15)
MCH RBC QN AUTO: 29.1 PG (ref 26.5–33)
MCHC RBC AUTO-ENTMCNC: 32.1 G/DL (ref 31.5–36.5)
MCV RBC AUTO: 91 FL (ref 78–100)
PLATELET # BLD AUTO: 279 10E3/UL (ref 150–450)
RBC # BLD AUTO: 4.61 10E6/UL (ref 3.8–5.2)
WBC # BLD AUTO: 7.7 10E3/UL (ref 4–11)

## 2022-03-18 PROCEDURE — 32408 CORE NDL BX LNG/MED PERQ: CPT

## 2022-03-18 PROCEDURE — 272N000506 HC NEEDLE CR6

## 2022-03-18 PROCEDURE — 88341 IMHCHEM/IMCYTCHM EA ADD ANTB: CPT | Mod: 26 | Performed by: PATHOLOGY

## 2022-03-18 PROCEDURE — 250N000011 HC RX IP 250 OP 636: Performed by: STUDENT IN AN ORGANIZED HEALTH CARE EDUCATION/TRAINING PROGRAM

## 2022-03-18 PROCEDURE — 999N000142 HC STATISTIC PROCEDURE PREP ONLY

## 2022-03-18 PROCEDURE — 88184 FLOWCYTOMETRY/ TC 1 MARKER: CPT | Performed by: ORTHOPAEDIC SURGERY

## 2022-03-18 PROCEDURE — 88305 TISSUE EXAM BY PATHOLOGIST: CPT | Mod: TC | Performed by: ORTHOPAEDIC SURGERY

## 2022-03-18 PROCEDURE — 36415 COLL VENOUS BLD VENIPUNCTURE: CPT | Performed by: PHYSICIAN ASSISTANT

## 2022-03-18 PROCEDURE — 99153 MOD SED SAME PHYS/QHP EA: CPT

## 2022-03-18 PROCEDURE — 99152 MOD SED SAME PHYS/QHP 5/>YRS: CPT

## 2022-03-18 PROCEDURE — 71045 X-RAY EXAM CHEST 1 VIEW: CPT | Mod: 26 | Performed by: STUDENT IN AN ORGANIZED HEALTH CARE EDUCATION/TRAINING PROGRAM

## 2022-03-18 PROCEDURE — 250N000009 HC RX 250: Performed by: STUDENT IN AN ORGANIZED HEALTH CARE EDUCATION/TRAINING PROGRAM

## 2022-03-18 PROCEDURE — 88305 TISSUE EXAM BY PATHOLOGIST: CPT | Mod: 26 | Performed by: PATHOLOGY

## 2022-03-18 PROCEDURE — 99152 MOD SED SAME PHYS/QHP 5/>YRS: CPT | Mod: GC | Performed by: RADIOLOGY

## 2022-03-18 PROCEDURE — 88188 FLOWCYTOMETRY/READ 9-15: CPT | Performed by: PATHOLOGY

## 2022-03-18 PROCEDURE — 272N000631 HC COIL/EMBOLIC DEVICE CR12

## 2022-03-18 PROCEDURE — 999N000133 HC STATISTIC PP CARE STAGE 2

## 2022-03-18 PROCEDURE — 88185 FLOWCYTOMETRY/TC ADD-ON: CPT | Performed by: ORTHOPAEDIC SURGERY

## 2022-03-18 PROCEDURE — 85610 PROTHROMBIN TIME: CPT | Mod: GZ | Performed by: PHYSICIAN ASSISTANT

## 2022-03-18 PROCEDURE — 258N000003 HC RX IP 258 OP 636: Performed by: PHYSICIAN ASSISTANT

## 2022-03-18 PROCEDURE — 999N000065 XR CHEST 1 VIEW

## 2022-03-18 PROCEDURE — 88342 IMHCHEM/IMCYTCHM 1ST ANTB: CPT | Mod: 26 | Performed by: PATHOLOGY

## 2022-03-18 PROCEDURE — 32408 CORE NDL BX LNG/MED PERQ: CPT | Mod: LT | Performed by: RADIOLOGY

## 2022-03-18 PROCEDURE — 85048 AUTOMATED LEUKOCYTE COUNT: CPT | Performed by: PHYSICIAN ASSISTANT

## 2022-03-18 RX ORDER — NALOXONE HYDROCHLORIDE 0.4 MG/ML
0.4 INJECTION, SOLUTION INTRAMUSCULAR; INTRAVENOUS; SUBCUTANEOUS
Status: DISCONTINUED | OUTPATIENT
Start: 2022-03-18 | End: 2022-03-18 | Stop reason: HOSPADM

## 2022-03-18 RX ORDER — FENTANYL CITRATE 50 UG/ML
25-50 INJECTION, SOLUTION INTRAMUSCULAR; INTRAVENOUS EVERY 5 MIN PRN
Status: DISCONTINUED | OUTPATIENT
Start: 2022-03-18 | End: 2022-03-18 | Stop reason: HOSPADM

## 2022-03-18 RX ORDER — NALOXONE HYDROCHLORIDE 0.4 MG/ML
0.2 INJECTION, SOLUTION INTRAMUSCULAR; INTRAVENOUS; SUBCUTANEOUS
Status: DISCONTINUED | OUTPATIENT
Start: 2022-03-18 | End: 2022-03-18 | Stop reason: HOSPADM

## 2022-03-18 RX ORDER — SODIUM CHLORIDE 9 MG/ML
INJECTION, SOLUTION INTRAVENOUS CONTINUOUS
Status: DISCONTINUED | OUTPATIENT
Start: 2022-03-18 | End: 2022-03-18 | Stop reason: HOSPADM

## 2022-03-18 RX ORDER — ACETAMINOPHEN 325 MG/1
650 TABLET ORAL EVERY 4 HOURS PRN
Status: DISCONTINUED | OUTPATIENT
Start: 2022-03-18 | End: 2022-03-18 | Stop reason: HOSPADM

## 2022-03-18 RX ORDER — FLUMAZENIL 0.1 MG/ML
0.2 INJECTION, SOLUTION INTRAVENOUS
Status: DISCONTINUED | OUTPATIENT
Start: 2022-03-18 | End: 2022-03-18 | Stop reason: HOSPADM

## 2022-03-18 RX ORDER — LIDOCAINE 40 MG/G
CREAM TOPICAL
Status: DISCONTINUED | OUTPATIENT
Start: 2022-03-18 | End: 2022-03-18 | Stop reason: HOSPADM

## 2022-03-18 RX ADMIN — LIDOCAINE HYDROCHLORIDE 10 ML: 10 INJECTION, SOLUTION EPIDURAL; INFILTRATION; INTRACAUDAL; PERINEURAL at 11:45

## 2022-03-18 RX ADMIN — MIDAZOLAM 2 MG: 1 INJECTION INTRAMUSCULAR; INTRAVENOUS at 11:45

## 2022-03-18 RX ADMIN — FENTANYL CITRATE 100 MCG: 50 INJECTION, SOLUTION INTRAMUSCULAR; INTRAVENOUS at 11:45

## 2022-03-18 RX ADMIN — SODIUM CHLORIDE: 9 INJECTION, SOLUTION INTRAVENOUS at 09:48

## 2022-03-18 NOTE — PROGRESS NOTES
Patient returned to 2A s/p liver biopsy. A&O, VSS on RA. Left upper back site C/D/I. Chest xray to be done at 1300. Will continue to monitor.

## 2022-03-18 NOTE — PROGRESS NOTES
Pt arrives to 2a, with spouse, for lung biopsy. H&P needs to be updated. Consent needs to be signed. Labs sent.

## 2022-03-18 NOTE — PRE-PROCEDURE
GENERAL PRE-PROCEDURE:   Procedure:  Image guided left lung biopsy  Date/Time:  3/18/2022 10:32 AM    Verbal consent obtained?: Yes    Written consent obtained?: Yes    Risks and benefits: Risks, benefits and alternatives were discussed    DC Plan: Appropriate discharge home plan in place for patients who are going home after procedure   Consent given by:  Patient  Patient states understanding of procedure being performed: Yes    Patient's understanding of procedure matches consent: Yes    Procedure consent matches procedure scheduled: Yes    Expected level of sedation:  Moderate  Appropriately NPO:  Yes  ASA Class:  2  Mallampati  :  Grade 2- soft palate, base of uvula, tonsillar pillars, and portion of posterior pharyngeal wall visible  Lungs:  Lungs clear with good breath sounds bilaterally  Heart:  Normal heart sounds and rate  History & Physical reviewed:  History and physical reviewed and no updates needed  Statement of review:  I have reviewed the lab findings, diagnostic data, medications, and the plan for sedation

## 2022-03-18 NOTE — TELEPHONE ENCOUNTER
RECORDS STATUS - ALL OTHER DIAGNOSIS      Action    Action Taken 3/18/22  LVM for pt re: recs call    Spoke w/ Idania @ [facility] - we have most recent note. Requested additional records (initial consultation & mix of notes from time pt has been there & any chemo/rad notes)  11:27 AM    3/21/22  Additional records from [facility] received, sent to HIM for upload. Emailed to Pa.  7:28 AM       Action 2022 12:13 PM ABT   Action Taken Slides from [place] received and sent to 5th floor path lab for review     RECORDS RECEIVED FROM: Jamal Winslow, [facility], [facility]ATI (outside imaging previously resolved)   DATE RECEIVED: 3/18   NOTES STATUS DETAILS   OFFICE NOTE from referring provider Miguel Hardin MD in [facility]: 3/8/22   OFFICE NOTE from medical oncologist [facility] Dr. Valerio 21   DISCHARGE SUMMARY from hospital [facility] 3/18/22: Epic    18, 14, 14: [facility] [place]   OPERATIVE REPORT Epic/CE - [facility]  22: Core Bx of Right Buttocks  07: Kidney Donor    [facility]  18: Resection of Rt Retroperitoneal Mass  14: BYPASS FEMORAL TIBIAL   MEDICATION LIST [facility]    LABS     PATHOLOGY REPORTS [facility] [employer]  [facility] [employer] [facility]  22: Surg Path    [facility]  18: [id number]  2/27/10: [id number]    [facility]  18: [id number]     ANYTHING RELATED TO DIAGNOSIS Epic 3/18/22   IMAGING (NEED IMAGES & REPORT)     CT SCANS PACS [facility], [facility], [facility]   MRI PACS [facility], [facility]   PET PACS 3/1/22: [facility]

## 2022-03-18 NOTE — PROGRESS NOTES
Dr Bowman reviewed post procedure xray and states no pneumothorax present. Per MD pt ok to discharge once meets criteria.

## 2022-03-18 NOTE — DISCHARGE INSTRUCTIONS
[facility]    Interventional Radiology  Patient Instructions Following Biopsy    AFTER YOU GO HOME  ? If you were given sedation DO NOT drive or operate machinery at home or at work for at least 24 hours  ? DO relax and take it easy for 48 hours, no strenuous activity for 24 hours  ? DO drink plenty of fluids  ? DO resume your regular diet, unless otherwise instructed by your Primary Physician  ? Keep the dressing dry and in place for 24 hours.  ? DO NOT SMOKE FOR AT LEAST 24 HOURS, if you have been given any medications that were to help you relax or sedate you during your procedure  ? DO NOT drink alcoholic beverages the day of your procedure  ? DO NOT do any strenuous exercise or lifting (> 10 lbs) for at least 7 days following your procedure  ? DO NOT take a shower for at least 12 hours following your procedure, no bath/pool for 5 days.  ? Remove dressing after shower the next day. Replace with Band aid for 2 days.  Never leave a wet dressing in place.  ? DO NOT make any important or legal decisions for 24 hours following your procedure  ? There should be minimum drainage from the biopsy site    CALL THE PHYSICIAN IF:  ? You start bleeding from the procedure site.  If you do start to bleed from that site, lie down flat and hold pressure on the site for a minimum of 10 minutes.  Your physician will tell you if you need to return to the hospital  ? You develop nausea or vomiting  ? You have excessive swelling, redness, or tenderness at the site  ? You have drainage that looks like it is infected.  ? You experience severe pain  ? You develop hives or a rash or unexplained itching  ? You develop shortness of breath  ? You develop a temperature of 101 degrees F or greate  ? You develop chest pain or cough up blood, lightheadedness or fainting    [facility] INTERVENTIONAL RADIOLOGY DEPARTMENT  Procedure Physicians: Dr Reyes, Dr Bowman                                       Date of procedure: March 18,  2022  Telephone Numbers: [phone] Monday-Friday 8:00 am to 4:30 pm  [phone] After 4:30 pm Monday-Friday, Weekends & Holidays.   Ask for the Interventional Radiologist on call.  Someone is on call 24 hrs/day  [facility] toll free number: [phone] Monday-Friday 8:00 am to 4:30 pm  [facility] Emergency Dept: [phone]

## 2022-03-18 NOTE — PROGRESS NOTES
Patient Name: Kenyatta Dhillon  Medical Record Number: [id number]  Today's Date: 3/18/2022    Procedure: Image guided lung biopsy, left. Possible chest tube placement.  Proceduralist: MD Nanette., MD Eric.    Pathology present: No, 3 cores obtained and sent to lab.    Procedure Start: 1128  Procedure end: 1155  Sedation medications administered: Fentanyl: 100 mcg Versed:2mg     Report given to: 2A, RN  : n/a    Other Notes: Pt arrived to IR room CT2 from . Consent reviewed. Pt denies any questions or concerns regarding procedure. Pt positioned prone and monitored per protocol. Pt tolerated procedure without any noted complications. Pt transferred back to .    Chantale Main RN

## 2022-03-18 NOTE — PROCEDURES
[facility]    Procedure: IR Procedure Note - CT guided left lung biopsy    Date/Time: 3/18/2022 11:57 AM  Performed by: Jossue Reyes MD  Authorized by: Jossue Reyes MD   IR Fellow Physician:  Radiology Resident Physician: PETER Reyes MD  Other(s) attending procedure: Wang Bowman MD      UNIVERSAL PROTOCOL   Site Marked: Yes  Prior Images Obtained and Reviewed:  Yes  Required items: Required blood products, implants, devices and special equipment available    Patient identity confirmed:  Verbally with patient, arm band, provided demographic data and hospital-assigned identification number  Patient was reevaluated immediately before administering moderate or deep sedation or anesthesia  Confirmation Checklist:  Patient's identity using two indicators, relevant allergies, procedure was appropriate and matched the consent or emergent situation and correct equipment/implants were available  Time out: Immediately prior to the procedure a time out was called    Universal Protocol: the Joint Commission Universal Protocol was followed    Preparation: Patient was prepped and draped in usual sterile fashion    ESBL (mL):  2     ANESTHESIA    Anesthesia: Local infiltration  Local Anesthetic:  Lidocaine 1% without epinephrine  Anesthetic Total (mL):  8      SEDATION  Patient Sedated: Yes    Sedation:  Fentanyl and midazolam  Vital signs: Vital signs monitored during sedation    See dictated procedure note for full details.  Findings: Pleural based left lung nodules.     Specimens: core needle biopsy specimens sent for pathological analysis    Complications: None    Condition: Stable    Plan: - 2 hours bedrest  - 1 hour postprocedural chest xray  - follow up with pathology for results      PROCEDURE  Describe Procedure: CT guided left lung biopsy  Patient Tolerance:  Patient tolerated the procedure well with no immediate complications  Length of time physician/provider  present for 1:1 monitoring during sedation: 25

## 2022-03-18 NOTE — PROGRESS NOTES
Pt has tolerated PO. Ambulated in pagan with steady gait. Voided x1. Left upper back site remains CDI, soft, flat, non tender. Discharge instructions reviewed with pt, pt verbalizes understanding. PIV d/c'd.   Pt's SBP pre and post procedure has mainly been 140s-160s, denies symptoms, but states she is under a lot of stress, per pt she isn't being managed for hypertension . Pt reports she has BP machine at home. Encouraged pt to take it twice a day and keep a log for her PCP.     1355 Pt transported to [facility] via wheelchair. Spouse present and providing ride home.

## 2022-03-21 LAB
PATH REPORT.COMMENTS IMP SPEC: NORMAL
PATH REPORT.FINAL DX SPEC: NORMAL
PATH REPORT.MICROSCOPIC SPEC OTHER STN: NORMAL
PATH REPORT.RELEVANT HX SPEC: NORMAL

## 2022-03-22 LAB
PATH REPORT.COMMENTS IMP SPEC: NORMAL
PATH REPORT.FINAL DX SPEC: NORMAL
PATH REPORT.GROSS SPEC: NORMAL
PATH REPORT.MICROSCOPIC SPEC OTHER STN: NORMAL
PATH REPORT.RELEVANT HX SPEC: NORMAL
PHOTO IMAGE: NORMAL

## 2022-03-23 ENCOUNTER — PRE VISIT (OUTPATIENT)
Dept: ONCOLOGY | Facility: CLINIC | Age: 62
End: 2022-03-23
Payer: COMMERCIAL

## 2022-03-23 ENCOUNTER — VIRTUAL VISIT (OUTPATIENT)
Dept: ONCOLOGY | Facility: CLINIC | Age: 62
End: 2022-03-23
Attending: ORTHOPAEDIC SURGERY
Payer: COMMERCIAL

## 2022-03-23 DIAGNOSIS — N18.30 STAGE 3 CHRONIC KIDNEY DISEASE, UNSPECIFIED WHETHER STAGE 3A OR 3B CKD (H): Primary | ICD-10-CM

## 2022-03-23 DIAGNOSIS — F32.5 MAJOR DEPRESSIVE DISORDER WITH SINGLE EPISODE, IN REMISSION (H): ICD-10-CM

## 2022-03-23 DIAGNOSIS — Z15.01 LI-FRAUMENI SYNDROME: ICD-10-CM

## 2022-03-23 DIAGNOSIS — Z90.5 S/P NEPHRECTOMY: ICD-10-CM

## 2022-03-23 DIAGNOSIS — C48.0: ICD-10-CM

## 2022-03-23 DIAGNOSIS — Z85.820 H/O MALIGNANT MELANOMA OF SKIN: ICD-10-CM

## 2022-03-23 DIAGNOSIS — K25.9 GASTRIC EROSION, UNSPECIFIED CHRONICITY: ICD-10-CM

## 2022-03-23 DIAGNOSIS — Z15.01 MUTATION IN TP53 GENE: ICD-10-CM

## 2022-03-23 DIAGNOSIS — Z15.89 MUTATION IN TP53 GENE: ICD-10-CM

## 2022-03-23 DIAGNOSIS — C49.9 SARCOMA OF SOFT TISSUE (H): ICD-10-CM

## 2022-03-23 DIAGNOSIS — Z15.09 MUTATION IN TP53 GENE: ICD-10-CM

## 2022-03-23 PROCEDURE — 99205 OFFICE O/P NEW HI 60 MIN: CPT | Mod: GT | Performed by: INTERNAL MEDICINE

## 2022-03-23 NOTE — LETTER
3/23/2022         RE: Kenyatta Dhillon  [address]  [place] [address]        Dear Colleague,    Thank you for referring your patient, Kenyatta Dhillon, to the [facility]. Please see a copy of my visit note below.    Jo-Ann is a 61 year old who is being evaluated via a billable video visit.      How would you like to obtain your AVS? MyChart  If the video visit is dropped, the invitation should be resent by: Send to e-mail at: [email or web address]  Will anyone else be joining your video visit? No    Avis Gr     Video Start Timabout 200  Video-Visit Details    Type of service:  Video Visit    Video End Timeabout 236    Originating Location (pt. 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  She then did well untill 2014, other than multiple skin cancers including melanomas, when she developed some leg edema and was found to have a pelvic mass.  This was resected on 2/27/2014 and felt to be a recurrent leiomyosarcoma.  Biopsy of this lesion was ER and GA positive.  In 2018 she developed left leg swelling and was found to have a retroperitoneal mass on the right; biopsy also showed a leiomyosarcoma.  In May 2018 this was laparoscopically resected; this time it showed a leiomyosarcoma that was ER/GA negative.  Notably she donated her left kidney to her 's brother in 2007.  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  Biopsy of the gluteal mass showed leiomyosarcoma.  She is now here to see how to proceed.  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  She can go up 2 floors okay but does not do any structured exercise.  She takes Prilosec daily though she has no GERD symptoms as a gastroenterologist noted gastric ulcerations and suggest that she take it though she was asymptomatic the time.  She also takes Lasix about every 2 days for leg edema but she is not sure if it helps.  She does wear compression pantyhose.  She has been on Wellbutrin for depression since  when her mother  and is not sure if she needs this and her mood is good.      Past history is as noted above.  Donated L kidney, lege edema, Li_Fraumeni, gastric ulcerations, depression.  Family history is markedly positive and per the chart; she has multiple family members with multiple malignancies including breast cancer in one at age 5.    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      On exam she appeared comfortable normal affect.HENT full EOMs  CHEST no respiratory distress  NEURO normal mentation and speech  PSYCH appropriate but positive mood.  _  I reviewed the pathology of the lung biopsy.  Case: [id number]   3-18-22  Lung, left, needle core biopsy:  -Metastatic leiomyosarcoma    Case: [id number]   22  Soft tissue, right buttock, needle biopsy:  - Leiomyosarcoma, high grade    -  I reviewed the images showing multiple metastatic lesions.    Formal read   PET-CT 3-1-22  IMPRESSION: In this patient with history of Li Fraumeni syndrome,  history of retroperitoneal leiomyosarcoma, now with newly diagnosed  right gluteal leiomyosarcoma:     1. Primary lesion in the right gluteal musculature  with SUV max of  13.3.  2. Findings consistent with regional spread of disease including FDG  avid lesions in the right superior pubic ramus and base of the sacrum,  correlating with enhancing lesions on prior MRI.  3. Numerous FDG avid pulmonary nodules consistent with distant  metastatic disease.  4. Small FDG avid soft tissue densities, in particular right  retroperitoneum, left thigh and posterior paraspinal subcutaneous  tissues, suggestive of distant soft tissue lesions.  5. Additional large FDG avid soft mass in the proximal left upper arm.  6. Question intramuscular FDG uptake vs muscular activity along the  left scapula.  7. Partially characterized FDG uptake along the proximal right  forearm.  This could be further evaluated with dedicated radiograph.    -  We discussed the situation.  While its not possible to be totally sure of the current primary tumor it seems most likely this is all derived from the uterine leiomyosarcoma.  The original tumor was ER/VT positive but over time it seems to have lost that.  Regardless in this setting this biologic behavior is not likely to respond to endocrine treatment.  We discussed the seriousness of the situation and the low probability though not 0 of cure.  We discussed 2 options at this point one being Gemzar and Taxotere and the other being Keytruda both of which are reasonable.  I think it is more standard to try the gem-Taxotere first and we went over the typical toxicities of both as well as the use of Neulasta and the approach to neutropenic fever.  We will put in orders for gem tax and start the approval for Keytruda as well.  Doxil ifosfamide would be equally reasonable but I think maybe its best trying one of the other 2 first depending on how things proceed.  Trabectedin and other treatments are also later options.  We discussed possibly seeing palliative and she would like that we will arrange that.  We discussed NGS which we will start on the chance that  we find a good target though this is a low probability event as well.  We discussed that this could be done either here or with Dr. Perez at [place] and she will think about that and decide.  She will need a CT CAP the day of starting treatment given the time interval so we are sure where we are starting.    All of her questions were addressed and she will call if others arise.  t>60 min including chart/image review and orders          Again, thank you for allowing me to participate in the care of your patient.      Sincerely,    Kai Elizalde MD

## 2022-03-23 NOTE — PROGRESS NOTES
Jo-Ann is a 61 year old who is being evaluated via a billable video visit.      How would you like to obtain your AVS? MyChart  If the video visit is dropped, the invitation should be resent by: Send to e-mail at: [email or web address]  Will anyone else be joining your video visit? [place]    Avis Gr VF    Video Start Timabout 200  Video-Visit Details    Type of service:  Video Visit    Video End Timeabout 236    Originating Location (pt. 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  She then did well untill 2014, other than multiple skin cancers including melanomas, when she developed some leg edema and was found to have a pelvic mass.  This was resected on 2/27/2014 and felt to be a recurrent leiomyosarcoma.  Biopsy of this lesion was ER and ND positive.  In 2018 she developed left leg swelling and was found to have a retroperitoneal mass on the right; biopsy also showed a leiomyosarcoma.  In May 2018 this was laparoscopically resected; this time it showed a leiomyosarcoma that was ER/ND negative.  Notably she donated her left kidney to her 's brother in 2007.  Following diagnosis of Li-Fraumeni syndrome she has been having routine imaging and routine imaging in December 2021 showed a mass in the right buttocks leading to further evaluation, more recently with a PET scan showing multiple metastatic lesions.  Biopsy of the gluteal mass showed leiomyosarcoma.  She is now here to see how to proceed.  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  She can go up 2 floors okay but does not do any structured exercise.  She takes Prilosec daily though she has no GERD symptoms as a gastroenterologist noted gastric ulcerations and suggest that she take it though she was asymptomatic the time.  She also takes Lasix about every 2 days for leg edema but she is not sure if it helps.  She does wear compression pantyhose.  She has been on Wellbutrin for depression since  when her mother  and is not sure if she needs this and her mood is good.      Past history is as noted above.  Donated L kidney, lege edema, Li_Fraumeni, gastric ulcerations, depression.  Family history is markedly positive and per the chart; she has multiple family members with multiple malignancies including breast cancer in one at age 5.    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      On exam she appeared comfortable normal affect.HENT full EOMs  CHEST no respiratory distress  NEURO normal mentation and speech  PSYCH appropriate but positive mood.  _  I reviewed the pathology of the lung biopsy.  Case: [id number]   3-18-22  Lung, left, needle core biopsy:  -Metastatic leiomyosarcoma    Case: [id number]   22  Soft tissue, right buttock, needle biopsy:  - Leiomyosarcoma, high grade    -  I reviewed the images showing multiple metastatic lesions.    Formal read   PET-CT 3-1-22  IMPRESSION: In this patient with history of Li Fraumeni syndrome,  history of retroperitoneal leiomyosarcoma, now with newly diagnosed  right gluteal leiomyosarcoma:     1. Primary lesion in the right gluteal musculature with SUV max of  13.3.  2. Findings consistent with regional spread of disease including FDG  avid lesions in the right superior pubic ramus and base of the sacrum,  correlating with enhancing lesions on prior MRI.  3. Numerous FDG avid pulmonary nodules  consistent with distant  metastatic disease.  4. Small FDG avid soft tissue densities, in particular right  retroperitoneum, left thigh and posterior paraspinal subcutaneous  tissues, suggestive of distant soft tissue lesions.  5. Additional large FDG avid soft mass in the proximal left upper arm.  6. Question intramuscular FDG uptake vs muscular activity along the  left scapula.  7. Partially characterized FDG uptake along the proximal right  forearm.  This could be further evaluated with dedicated radiograph.    -  We discussed the situation.  While its not possible to be totally sure of the current primary tumor it seems most likely this is all derived from the uterine leiomyosarcoma.  The original tumor was ER/MS positive but over time it seems to have lost that.  Regardless in this setting this biologic behavior is not likely to respond to endocrine treatment.  We discussed the seriousness of the situation and the low probability though not 0 of cure.  We discussed 2 options at this point one being Gemzar and Taxotere and the other being Keytruda both of which are reasonable.  I think it is more standard to try the gem-Taxotere first and we went over the typical toxicities of both as well as the use of Neulasta and the approach to neutropenic fever.  We will put in orders for gem tax and start the approval for Keytruda as well.  Doxil ifosfamide would be equally reasonable but I think maybe its best trying one of the other 2 first depending on how things proceed.  Trabectedin and other treatments are also later options.  We discussed possibly seeing palliative and she would like that we will arrange that.  We discussed NGS which we will start on the chance that we find a good target though this is a low probability event as well.  We discussed that this could be done either here or with Dr. Perez at [place] and she will think about that and decide.  She will need a CT CAP the day of starting treatment given the  time interval so we are sure where we are starting.    All of her questions were addressed and she will call if others arise.  t>60 min including chart/image review and orders    Kai Elizalde M.D.  Professor  Hematology, Oncology and Transplantation

## 2022-03-24 ENCOUNTER — TRANSFERRED RECORDS (OUTPATIENT)
Dept: HEALTH INFORMATION MANAGEMENT | Facility: CLINIC | Age: 62
End: 2022-03-24
Payer: COMMERCIAL

## 2022-03-25 ENCOUNTER — LAB (OUTPATIENT)
Dept: LAB | Facility: CLINIC | Age: 62
End: 2022-03-25
Payer: COMMERCIAL

## 2022-03-25 DIAGNOSIS — C49.9 SARCOMA (H): Primary | ICD-10-CM

## 2022-03-25 PROCEDURE — 88321 CONSLTJ&REPRT SLD PREP ELSWR: CPT | Performed by: PATHOLOGY

## 2022-03-25 NOTE — RESULT ENCOUNTER NOTE
Jo-Ann,  Just a note to let you know that I did see, and was sorry to note, the results of your lung biopsy nodule representing the leiomyosarcoma.  I know you had a virtual consultation visit with Dr. Elizalde yesterday.    At this time, I do not see surgical removal of your tumor as the primary mode of treatment for reasons that Dr. Elizalde went into yesterday.  Please let me know if there is anything more that I can do to help you.    Best wishes,     MD Lexi Lee Family Professor  Oncology and Adult Reconstructive Surgery  Dept Orthopaedic Surgery, [facility]  [phone] office, [phone] pager  [email or web address]

## 2022-03-28 ENCOUNTER — LAB (OUTPATIENT)
Dept: LAB | Facility: CLINIC | Age: 62
End: 2022-03-28
Payer: COMMERCIAL

## 2022-03-28 DIAGNOSIS — C49.9 SARCOMA (H): Primary | ICD-10-CM

## 2022-03-28 LAB
PATH REPORT.COMMENTS IMP SPEC: NORMAL
PATH REPORT.FINAL DX SPEC: NORMAL
PATH REPORT.FINAL DX SPEC: NORMAL
PATH REPORT.GROSS SPEC: NORMAL
PATH REPORT.GROSS SPEC: NORMAL
PATH REPORT.RELEVANT HX SPEC: NORMAL
PATH REPORT.SITE OF ORIGIN SPEC: NORMAL
PATH REPORT.SITE OF ORIGIN SPEC: NORMAL

## 2022-03-28 PROCEDURE — 88321 CONSLTJ&REPRT SLD PREP ELSWR: CPT | Performed by: PATHOLOGY

## 2022-03-31 ENCOUNTER — DOCUMENTATION ONLY (OUTPATIENT)
Dept: ONCOLOGY | Facility: CLINIC | Age: 62
End: 2022-03-31
Payer: COMMERCIAL

## 2022-03-31 NOTE — PROGRESS NOTES
CLINICAL NUTRITION SERVICES     Reason for Contact: Patient was contacted by phone due to requested to speak with a Dietitian on the Oncology Distress Screening tool.    Action: RD called patient indicating reason for phone call. Left a VM with a return call back number.     Follow up: Wait for a return phone call.    Ethel Pompa RD, LD  [phone]

## 2022-04-07 ENCOUNTER — TRANSFERRED RECORDS (OUTPATIENT)
Dept: HEALTH INFORMATION MANAGEMENT | Facility: CLINIC | Age: 62
End: 2022-04-07
Payer: COMMERCIAL

## 2022-04-07 ENCOUNTER — LAB REQUISITION (OUTPATIENT)
Dept: LAB | Facility: CLINIC | Age: 62
End: 2022-04-07
Payer: COMMERCIAL

## 2022-04-10 ENCOUNTER — HEALTH MAINTENANCE LETTER (OUTPATIENT)
Age: 62
End: 2022-04-10

## 2022-04-14 ENCOUNTER — TRANSFERRED RECORDS (OUTPATIENT)
Dept: HEALTH INFORMATION MANAGEMENT | Facility: CLINIC | Age: 62
End: 2022-04-14
Payer: COMMERCIAL

## 2022-05-11 ENCOUNTER — LAB REQUISITION (OUTPATIENT)
Dept: LAB | Facility: CLINIC | Age: 62
End: 2022-05-11
Payer: COMMERCIAL

## 2022-05-24 LAB
BKR LAB AP ADD'L TEST STATUS: NORMAL
BKR PATH ADDL TEST FINAL COMMENTS: NORMAL

## 2022-05-30 ENCOUNTER — LAB (OUTPATIENT)
Dept: LAB | Facility: CLINIC | Age: 62
End: 2022-05-30
Payer: COMMERCIAL

## 2022-05-30 DIAGNOSIS — D49.2 SOFT TISSUE TUMOR: Primary | ICD-10-CM

## 2022-05-30 LAB
BKR LAB AP ADD'L TEST STATUS: NORMAL
BKR PATH ADDL TEST FINAL COMMENTS: NORMAL

## 2022-05-30 PROCEDURE — 88342 IMHCHEM/IMCYTCHM 1ST ANTB: CPT | Performed by: PATHOLOGY

## 2022-05-30 PROCEDURE — 84999 UNLISTED CHEMISTRY PROCEDURE: CPT | Performed by: PATHOLOGY

## 2022-05-30 PROCEDURE — 88341 IMHCHEM/IMCYTCHM EA ADD ANTB: CPT | Performed by: PATHOLOGY

## 2022-06-09 ENCOUNTER — TRANSFERRED RECORDS (OUTPATIENT)
Dept: HEALTH INFORMATION MANAGEMENT | Facility: CLINIC | Age: 62
End: 2022-06-09
Payer: COMMERCIAL

## 2022-07-14 ENCOUNTER — TRANSFERRED RECORDS (OUTPATIENT)
Dept: HEALTH INFORMATION MANAGEMENT | Facility: CLINIC | Age: 62
End: 2022-07-14

## 2022-07-15 DIAGNOSIS — R06.02 SHORTNESS OF BREATH ON EXERTION: ICD-10-CM

## 2022-07-15 DIAGNOSIS — C54.9 SARCOMA OF UTERUS (H): Primary | ICD-10-CM

## 2022-07-15 DIAGNOSIS — R60.0 LOWER EXTREMITY EDEMA: ICD-10-CM

## 2022-07-19 ENCOUNTER — HOSPITAL ENCOUNTER (OUTPATIENT)
Dept: CARDIOLOGY | Facility: CLINIC | Age: 62
Discharge: HOME | End: 2022-07-19
Attending: NURSE PRACTITIONER | Admitting: NURSE PRACTITIONER
Payer: COMMERCIAL

## 2022-07-19 DIAGNOSIS — R60.0 LOWER EXTREMITY EDEMA: ICD-10-CM

## 2022-07-19 DIAGNOSIS — R06.02 SHORTNESS OF BREATH ON EXERTION: ICD-10-CM

## 2022-07-19 DIAGNOSIS — C54.9 SARCOMA OF UTERUS (H): ICD-10-CM

## 2022-07-19 LAB — LVEF ECHO: NORMAL

## 2022-07-19 PROCEDURE — 93306 TTE W/DOPPLER COMPLETE: CPT

## 2022-07-19 PROCEDURE — 93306 TTE W/DOPPLER COMPLETE: CPT | Mod: 26 | Performed by: INTERNAL MEDICINE

## 2022-08-25 ENCOUNTER — TRANSFERRED RECORDS (OUTPATIENT)
Dept: HEALTH INFORMATION MANAGEMENT | Facility: CLINIC | Age: 62
End: 2022-08-25

## 2022-09-22 ENCOUNTER — TRANSFERRED RECORDS (OUTPATIENT)
Dept: HEALTH INFORMATION MANAGEMENT | Facility: CLINIC | Age: 62
End: 2022-09-22

## 2022-10-14 ENCOUNTER — TRANSFERRED RECORDS (OUTPATIENT)
Dept: HEALTH INFORMATION MANAGEMENT | Facility: CLINIC | Age: 62
End: 2022-10-14

## 2022-10-15 ENCOUNTER — HEALTH MAINTENANCE LETTER (OUTPATIENT)
Age: 62
End: 2022-10-15

## 2022-12-16 ENCOUNTER — TRANSFERRED RECORDS (OUTPATIENT)
Dept: HEALTH INFORMATION MANAGEMENT | Facility: CLINIC | Age: 62
End: 2022-12-16

## 2023-02-24 ENCOUNTER — TRANSFERRED RECORDS (OUTPATIENT)
Dept: HEALTH INFORMATION MANAGEMENT | Facility: CLINIC | Age: 63
End: 2023-02-24
Payer: COMMERCIAL

## 2023-03-24 ENCOUNTER — TRANSFERRED RECORDS (OUTPATIENT)
Dept: HEALTH INFORMATION MANAGEMENT | Facility: CLINIC | Age: 63
End: 2023-03-24
Payer: COMMERCIAL

## 2023-04-28 ENCOUNTER — TRANSFERRED RECORDS (OUTPATIENT)
Dept: HEALTH INFORMATION MANAGEMENT | Facility: CLINIC | Age: 63
End: 2023-04-28
Payer: COMMERCIAL

## 2023-05-19 ENCOUNTER — TRANSFERRED RECORDS (OUTPATIENT)
Dept: HEALTH INFORMATION MANAGEMENT | Facility: CLINIC | Age: 63
End: 2023-05-19
Payer: COMMERCIAL

## 2023-05-27 ENCOUNTER — HEALTH MAINTENANCE LETTER (OUTPATIENT)
Age: 63
End: 2023-05-27

## 2023-06-02 ENCOUNTER — TRANSFERRED RECORDS (OUTPATIENT)
Dept: HEALTH INFORMATION MANAGEMENT | Facility: CLINIC | Age: 63
End: 2023-06-02

## 2023-06-30 ENCOUNTER — TRANSFERRED RECORDS (OUTPATIENT)
Dept: HEALTH INFORMATION MANAGEMENT | Facility: CLINIC | Age: 63
End: 2023-06-30
Payer: COMMERCIAL

## 2023-08-25 ENCOUNTER — TRANSFERRED RECORDS (OUTPATIENT)
Dept: HEALTH INFORMATION MANAGEMENT | Facility: CLINIC | Age: 63
End: 2023-08-25
Payer: COMMERCIAL

## 2023-10-06 ENCOUNTER — TRANSFERRED RECORDS (OUTPATIENT)
Dept: HEALTH INFORMATION MANAGEMENT | Facility: CLINIC | Age: 63
End: 2023-10-06
Payer: COMMERCIAL

## 2023-11-17 ENCOUNTER — TRANSFERRED RECORDS (OUTPATIENT)
Dept: HEALTH INFORMATION MANAGEMENT | Facility: CLINIC | Age: 63
End: 2023-11-17
Payer: COMMERCIAL

## 2023-12-22 ENCOUNTER — TRANSFERRED RECORDS (OUTPATIENT)
Dept: HEALTH INFORMATION MANAGEMENT | Facility: CLINIC | Age: 63
End: 2023-12-22
Payer: COMMERCIAL

## 2024-01-26 ENCOUNTER — TRANSFERRED RECORDS (OUTPATIENT)
Dept: HEALTH INFORMATION MANAGEMENT | Facility: CLINIC | Age: 64
End: 2024-01-26
Payer: COMMERCIAL

## 2024-02-23 ENCOUNTER — TRANSFERRED RECORDS (OUTPATIENT)
Dept: HEALTH INFORMATION MANAGEMENT | Facility: CLINIC | Age: 64
End: 2024-02-23
Payer: COMMERCIAL

## 2024-03-07 ENCOUNTER — TRANSFERRED RECORDS (OUTPATIENT)
Dept: HEALTH INFORMATION MANAGEMENT | Facility: CLINIC | Age: 64
End: 2024-03-07
Payer: COMMERCIAL

## 2024-03-17 ENCOUNTER — HEALTH MAINTENANCE LETTER (OUTPATIENT)
Age: 64
End: 2024-03-17

## 2024-04-15 ENCOUNTER — TRANSFERRED RECORDS (OUTPATIENT)
Dept: HEALTH INFORMATION MANAGEMENT | Facility: CLINIC | Age: 64
End: 2024-04-15
Payer: COMMERCIAL

## 2024-05-17 ENCOUNTER — TRANSFERRED RECORDS (OUTPATIENT)
Dept: HEALTH INFORMATION MANAGEMENT | Facility: CLINIC | Age: 64
End: 2024-05-17
Payer: COMMERCIAL

## 2024-06-07 ENCOUNTER — TRANSFERRED RECORDS (OUTPATIENT)
Dept: HEALTH INFORMATION MANAGEMENT | Facility: CLINIC | Age: 64
End: 2024-06-07
Payer: COMMERCIAL

## 2024-07-19 ENCOUNTER — TRANSFERRED RECORDS (OUTPATIENT)
Dept: HEALTH INFORMATION MANAGEMENT | Facility: CLINIC | Age: 64
End: 2024-07-19
Payer: COMMERCIAL

## 2024-08-04 ENCOUNTER — HEALTH MAINTENANCE LETTER (OUTPATIENT)
Age: 64
End: 2024-08-04

## 2024-08-09 ENCOUNTER — TRANSFERRED RECORDS (OUTPATIENT)
Dept: HEALTH INFORMATION MANAGEMENT | Facility: CLINIC | Age: 64
End: 2024-08-09
Payer: COMMERCIAL

## 2024-08-30 ENCOUNTER — TRANSFERRED RECORDS (OUTPATIENT)
Dept: HEALTH INFORMATION MANAGEMENT | Facility: CLINIC | Age: 64
End: 2024-08-30
Payer: COMMERCIAL

## 2024-09-20 ENCOUNTER — TRANSFERRED RECORDS (OUTPATIENT)
Dept: HEALTH INFORMATION MANAGEMENT | Facility: CLINIC | Age: 64
End: 2024-09-20
Payer: COMMERCIAL

## 2024-11-01 ENCOUNTER — TRANSFERRED RECORDS (OUTPATIENT)
Dept: HEALTH INFORMATION MANAGEMENT | Facility: CLINIC | Age: 64
End: 2024-11-01
Payer: COMMERCIAL

## 2024-11-22 ENCOUNTER — TRANSFERRED RECORDS (OUTPATIENT)
Dept: HEALTH INFORMATION MANAGEMENT | Facility: CLINIC | Age: 64
End: 2024-11-22
Payer: COMMERCIAL

## 2024-12-06 ENCOUNTER — TRANSFERRED RECORDS (OUTPATIENT)
Dept: HEALTH INFORMATION MANAGEMENT | Facility: CLINIC | Age: 64
End: 2024-12-06
Payer: COMMERCIAL

## 2025-01-17 ENCOUNTER — TRANSFERRED RECORDS (OUTPATIENT)
Dept: HEALTH INFORMATION MANAGEMENT | Facility: CLINIC | Age: 65
End: 2025-01-17
Payer: COMMERCIAL

## 2025-02-14 ENCOUNTER — TRANSFERRED RECORDS (OUTPATIENT)
Dept: HEALTH INFORMATION MANAGEMENT | Facility: CLINIC | Age: 65
End: 2025-02-14
Payer: COMMERCIAL

## 2025-03-04 ENCOUNTER — TRANSFERRED RECORDS (OUTPATIENT)
Dept: HEALTH INFORMATION MANAGEMENT | Facility: CLINIC | Age: 65
End: 2025-03-04
Payer: COMMERCIAL

## 2025-03-14 ENCOUNTER — TRANSFERRED RECORDS (OUTPATIENT)
Dept: HEALTH INFORMATION MANAGEMENT | Facility: CLINIC | Age: 65
End: 2025-03-14
Payer: COMMERCIAL

## 2025-03-28 ENCOUNTER — TRANSFERRED RECORDS (OUTPATIENT)
Dept: HEALTH INFORMATION MANAGEMENT | Facility: CLINIC | Age: 65
End: 2025-03-28
Payer: COMMERCIAL

## 2025-04-18 ENCOUNTER — TRANSFERRED RECORDS (OUTPATIENT)
Dept: HEALTH INFORMATION MANAGEMENT | Facility: CLINIC | Age: 65
End: 2025-04-18
Payer: COMMERCIAL

## 2025-05-08 ENCOUNTER — TRANSFERRED RECORDS (OUTPATIENT)
Dept: HEALTH INFORMATION MANAGEMENT | Facility: CLINIC | Age: 65
End: 2025-05-08
Payer: COMMERCIAL

## 2025-05-30 ENCOUNTER — TRANSFERRED RECORDS (OUTPATIENT)
Dept: HEALTH INFORMATION MANAGEMENT | Facility: CLINIC | Age: 65
End: 2025-05-30
Payer: COMMERCIAL

## 2025-07-11 ENCOUNTER — TRANSFERRED RECORDS (OUTPATIENT)
Dept: HEALTH INFORMATION MANAGEMENT | Facility: CLINIC | Age: 65
End: 2025-07-11
Payer: COMMERCIAL

## 2025-08-01 ENCOUNTER — TRANSFERRED RECORDS (OUTPATIENT)
Dept: HEALTH INFORMATION MANAGEMENT | Facility: CLINIC | Age: 65
End: 2025-08-01
Payer: COMMERCIAL

## 2025-08-08 ENCOUNTER — TRANSFERRED RECORDS (OUTPATIENT)
Dept: HEALTH INFORMATION MANAGEMENT | Facility: CLINIC | Age: 65
End: 2025-08-08
Payer: COMMERCIAL

## 2025-08-22 ENCOUNTER — TRANSFERRED RECORDS (OUTPATIENT)
Dept: HEALTH INFORMATION MANAGEMENT | Facility: CLINIC | Age: 65
End: 2025-08-22
Payer: COMMERCIAL

## (undated) RX ORDER — FENTANYL CITRATE 50 UG/ML
INJECTION, SOLUTION INTRAMUSCULAR; INTRAVENOUS
Status: DISPENSED
Start: 2022-03-18

## (undated) RX ORDER — LIDOCAINE HYDROCHLORIDE 10 MG/ML
INJECTION, SOLUTION EPIDURAL; INFILTRATION; INTRACAUDAL; PERINEURAL
Status: DISPENSED
Start: 2022-03-18

## (undated) RX ORDER — LIDOCAINE HYDROCHLORIDE 10 MG/ML
INJECTION, SOLUTION INFILTRATION; PERINEURAL
Status: DISPENSED
Start: 2022-02-21

## (undated) RX ORDER — SODIUM CHLORIDE 9 MG/ML
INJECTION, SOLUTION INTRAVENOUS
Status: DISPENSED
Start: 2022-03-18